# Patient Record
Sex: FEMALE | Race: WHITE | NOT HISPANIC OR LATINO | Employment: UNEMPLOYED | URBAN - METROPOLITAN AREA
[De-identification: names, ages, dates, MRNs, and addresses within clinical notes are randomized per-mention and may not be internally consistent; named-entity substitution may affect disease eponyms.]

---

## 2022-01-14 ENCOUNTER — PRE-ADMISSION TESTING (OUTPATIENT)
Dept: PREADMISSION TESTING | Facility: HOSPITAL | Age: 22
End: 2022-01-14

## 2022-01-14 VITALS — WEIGHT: 254.3 LBS | BODY MASS INDEX: 40.87 KG/M2 | HEIGHT: 66 IN

## 2022-01-14 LAB
ANION GAP SERPL CALCULATED.3IONS-SCNC: 11 MMOL/L (ref 5–15)
BUN SERPL-MCNC: 7 MG/DL (ref 6–20)
BUN/CREAT SERPL: 10.3 (ref 7–25)
CALCIUM SPEC-SCNC: 9 MG/DL (ref 8.6–10.5)
CHLORIDE SERPL-SCNC: 103 MMOL/L (ref 98–107)
CO2 SERPL-SCNC: 26 MMOL/L (ref 22–29)
CREAT SERPL-MCNC: 0.68 MG/DL (ref 0.57–1)
DEPRECATED RDW RBC AUTO: 42.9 FL (ref 37–54)
ERYTHROCYTE [DISTWIDTH] IN BLOOD BY AUTOMATED COUNT: 13.6 % (ref 12.3–15.4)
GFR SERPL CREATININE-BSD FRML MDRD: 110 ML/MIN/1.73
GLUCOSE SERPL-MCNC: 97 MG/DL (ref 65–99)
HCT VFR BLD AUTO: 44.1 % (ref 34–46.6)
HGB BLD-MCNC: 14.1 G/DL (ref 12–15.9)
MCH RBC QN AUTO: 27.8 PG (ref 26.6–33)
MCHC RBC AUTO-ENTMCNC: 32 G/DL (ref 31.5–35.7)
MCV RBC AUTO: 87 FL (ref 79–97)
PLATELET # BLD AUTO: 266 10*3/MM3 (ref 140–450)
PMV BLD AUTO: 11.7 FL (ref 6–12)
POTASSIUM SERPL-SCNC: 4.7 MMOL/L (ref 3.5–5.2)
RBC # BLD AUTO: 5.07 10*6/MM3 (ref 3.77–5.28)
SODIUM SERPL-SCNC: 140 MMOL/L (ref 136–145)
WBC NRBC COR # BLD: 7.97 10*3/MM3 (ref 3.4–10.8)

## 2022-01-14 PROCEDURE — 36415 COLL VENOUS BLD VENIPUNCTURE: CPT

## 2022-01-14 PROCEDURE — 80048 BASIC METABOLIC PNL TOTAL CA: CPT

## 2022-01-14 PROCEDURE — 85027 COMPLETE CBC AUTOMATED: CPT

## 2022-01-14 NOTE — PAT
Patient viewed general PAT education video as instructed in their preoperative information received from their surgeon.  Patient stated the general PAT education video was viewed in its entirety and survey completed.  Copies of [facility] general education handouts (Incentive Spirometry, Meds to Beds Program, Patient Belongings, Pre-op skin preparation instructions, Blood Glucose testing, Visitor policy, Surgery FAQ, Code H) distributed to patient if not printed. Education related to the PAT pass and skin preparation for surgery (if applicable) completed in [facility] as a reinforcement to PAT education video. Patient instructed to return PAT pass provided today as well as completed skin preparation sheet (if applicable) on the day of procedure.     Additionally if patient had not viewed video yet but intended to view it at home or in our waiting area, then referred them to the handout with QR code/link provided during PAT visit.  Instructed patient to complete survey after viewing the video in its entirety.  Encouraged patient/family to read [facility] general education handouts thoroughly and notify PAT staff with any questions or concerns. Patient verbalized understanding of all information and priority content.    Patient to apply Chlorhexadine wipes  to surgical area (as instructed) the night before procedure and the AM of procedure. Wipes provided.    Patient instructed to drink 20 ounces (or until full) of Gatorade and it needs to be completed 1 hour (for Main OR patients) or 2 hours (scheduled  section patients) before given arrival time for procedure (NO RED Gatorade)    Patient verbalized understanding.    Covid test scheduled 2022

## 2022-01-16 ENCOUNTER — APPOINTMENT (OUTPATIENT)
Dept: PREADMISSION TESTING | Facility: HOSPITAL | Age: 22
End: 2022-01-16

## 2022-01-16 LAB — SARS-COV-2 RNA PNL SPEC NAA+PROBE: NOT DETECTED

## 2022-01-16 PROCEDURE — U0004 COV-19 TEST NON-CDC HGH THRU: HCPCS | Performed by: INTERNAL MEDICINE

## 2022-01-16 PROCEDURE — C9803 HOPD COVID-19 SPEC COLLECT: HCPCS

## 2022-01-17 ENCOUNTER — ANESTHESIA EVENT (OUTPATIENT)
Dept: PERIOP | Facility: HOSPITAL | Age: 22
End: 2022-01-17

## 2022-01-17 RX ORDER — FAMOTIDINE 10 MG/ML
20 INJECTION, SOLUTION INTRAVENOUS ONCE
Status: CANCELLED | OUTPATIENT
Start: 2022-01-17 | End: 2022-01-17

## 2022-01-18 ENCOUNTER — ANESTHESIA EVENT CONVERTED (OUTPATIENT)
Dept: ANESTHESIOLOGY | Facility: HOSPITAL | Age: 22
End: 2022-01-18

## 2022-01-18 ENCOUNTER — HOSPITAL ENCOUNTER (OUTPATIENT)
Facility: HOSPITAL | Age: 22
Setting detail: HOSPITAL OUTPATIENT SURGERY
Discharge: HOME | End: 2022-01-18
Attending: PLASTIC SURGERY | Admitting: PLASTIC SURGERY

## 2022-01-18 ENCOUNTER — ANESTHESIA (OUTPATIENT)
Dept: PERIOP | Facility: HOSPITAL | Age: 22
End: 2022-01-18

## 2022-01-18 VITALS
WEIGHT: 254 LBS | TEMPERATURE: 97.6 F | BODY MASS INDEX: 40.82 KG/M2 | RESPIRATION RATE: 20 BRPM | HEART RATE: 62 BPM | DIASTOLIC BLOOD PRESSURE: 91 MMHG | HEIGHT: 66 IN | SYSTOLIC BLOOD PRESSURE: 178 MMHG | OXYGEN SATURATION: 96 %

## 2022-01-18 DIAGNOSIS — N64.81 BREAST PTOSIS: ICD-10-CM

## 2022-01-18 LAB
B-HCG UR QL: NEGATIVE
EXPIRATION DATE: NORMAL
INTERNAL NEGATIVE CONTROL: NORMAL
INTERNAL POSITIVE CONTROL: NORMAL
Lab: NORMAL

## 2022-01-18 PROCEDURE — 25010000002 FENTANYL CITRATE (PF) 50 MCG/ML SOLUTION: Performed by: NURSE ANESTHETIST, CERTIFIED REGISTERED

## 2022-01-18 PROCEDURE — P9041 ALBUMIN (HUMAN),5%, 50ML: HCPCS | Performed by: NURSE ANESTHETIST, CERTIFIED REGISTERED

## 2022-01-18 PROCEDURE — 0 CEFAZOLIN IN DEXTROSE 2-4 GM/100ML-% SOLUTION: Performed by: PLASTIC SURGERY

## 2022-01-18 PROCEDURE — 25010000002 PROPOFOL 10 MG/ML EMULSION: Performed by: NURSE ANESTHETIST, CERTIFIED REGISTERED

## 2022-01-18 PROCEDURE — 25010000002 ALBUMIN HUMAN 5% PER 50 ML: Performed by: NURSE ANESTHETIST, CERTIFIED REGISTERED

## 2022-01-18 PROCEDURE — 88302 TISSUE EXAM BY PATHOLOGIST: CPT | Performed by: PLASTIC SURGERY

## 2022-01-18 PROCEDURE — 25010000002 FENTANYL CITRATE (PF) 50 MCG/ML SOLUTION

## 2022-01-18 PROCEDURE — 25010000002 HYDROMORPHONE PER 4 MG: Performed by: NURSE ANESTHETIST, CERTIFIED REGISTERED

## 2022-01-18 PROCEDURE — 25010000002 HYDROMORPHONE 1 MG/ML SOLUTION

## 2022-01-18 PROCEDURE — 25010000002 NEOSTIGMINE 10 MG/10ML SOLUTION: Performed by: NURSE ANESTHETIST, CERTIFIED REGISTERED

## 2022-01-18 PROCEDURE — 81025 URINE PREGNANCY TEST: CPT | Performed by: ANESTHESIOLOGY

## 2022-01-18 PROCEDURE — 25010000002 MIDAZOLAM PER 1 MG: Performed by: ANESTHESIOLOGY

## 2022-01-18 PROCEDURE — 0 CEFAZOLIN PER 500 MG: Performed by: PLASTIC SURGERY

## 2022-01-18 PROCEDURE — 25010000002 GENTAMICIN PER 80 MG: Performed by: PLASTIC SURGERY

## 2022-01-18 PROCEDURE — 25010000002 EPINEPHRINE PER 0.1 MG: Performed by: PLASTIC SURGERY

## 2022-01-18 PROCEDURE — 25010000002 ONDANSETRON PER 1 MG: Performed by: NURSE ANESTHETIST, CERTIFIED REGISTERED

## 2022-01-18 PROCEDURE — 25010000002 DEXAMETHASONE SODIUM PHOSPHATE 10 MG/ML SOLUTION: Performed by: NURSE ANESTHETIST, CERTIFIED REGISTERED

## 2022-01-18 RX ORDER — NALOXONE HCL 0.4 MG/ML
0.4 VIAL (ML) INJECTION AS NEEDED
Status: DISCONTINUED | OUTPATIENT
Start: 2022-01-18 | End: 2022-01-18 | Stop reason: HOSPADM

## 2022-01-18 RX ORDER — MAGNESIUM HYDROXIDE 1200 MG/15ML
LIQUID ORAL AS NEEDED
Status: DISCONTINUED | OUTPATIENT
Start: 2022-01-18 | End: 2022-01-18 | Stop reason: HOSPADM

## 2022-01-18 RX ORDER — FENTANYL CITRATE 50 UG/ML
INJECTION, SOLUTION INTRAMUSCULAR; INTRAVENOUS AS NEEDED
Status: DISCONTINUED | OUTPATIENT
Start: 2022-01-18 | End: 2022-01-18 | Stop reason: SURG

## 2022-01-18 RX ORDER — SODIUM CHLORIDE 0.9 % (FLUSH) 0.9 %
10 SYRINGE (ML) INJECTION EVERY 12 HOURS SCHEDULED
Status: DISCONTINUED | OUTPATIENT
Start: 2022-01-18 | End: 2022-01-18 | Stop reason: HOSPADM

## 2022-01-18 RX ORDER — PROMETHAZINE HYDROCHLORIDE 25 MG/1
25 TABLET ORAL ONCE AS NEEDED
Status: DISCONTINUED | OUTPATIENT
Start: 2022-01-18 | End: 2022-01-18 | Stop reason: HOSPADM

## 2022-01-18 RX ORDER — GLYCOPYRROLATE 0.2 MG/ML
INJECTION INTRAMUSCULAR; INTRAVENOUS AS NEEDED
Status: DISCONTINUED | OUTPATIENT
Start: 2022-01-18 | End: 2022-01-18 | Stop reason: SURG

## 2022-01-18 RX ORDER — ROCURONIUM BROMIDE 10 MG/ML
INJECTION, SOLUTION INTRAVENOUS AS NEEDED
Status: DISCONTINUED | OUTPATIENT
Start: 2022-01-18 | End: 2022-01-18 | Stop reason: SURG

## 2022-01-18 RX ORDER — HYDROCODONE BITARTRATE AND ACETAMINOPHEN 5; 325 MG/1; MG/1
TABLET ORAL
Status: COMPLETED
Start: 2022-01-18 | End: 2022-01-18

## 2022-01-18 RX ORDER — ONDANSETRON 2 MG/ML
INJECTION INTRAMUSCULAR; INTRAVENOUS AS NEEDED
Status: DISCONTINUED | OUTPATIENT
Start: 2022-01-18 | End: 2022-01-18 | Stop reason: SURG

## 2022-01-18 RX ORDER — HYDROMORPHONE HYDROCHLORIDE 1 MG/ML
0.5 INJECTION, SOLUTION INTRAMUSCULAR; INTRAVENOUS; SUBCUTANEOUS
Status: DISCONTINUED | OUTPATIENT
Start: 2022-01-18 | End: 2022-01-18 | Stop reason: HOSPADM

## 2022-01-18 RX ORDER — SODIUM CHLORIDE, SODIUM LACTATE, POTASSIUM CHLORIDE, CALCIUM CHLORIDE 600; 310; 30; 20 MG/100ML; MG/100ML; MG/100ML; MG/100ML
9 INJECTION, SOLUTION INTRAVENOUS CONTINUOUS
Status: DISCONTINUED | OUTPATIENT
Start: 2022-01-18 | End: 2022-01-18 | Stop reason: HOSPADM

## 2022-01-18 RX ORDER — IPRATROPIUM BROMIDE AND ALBUTEROL SULFATE 2.5; .5 MG/3ML; MG/3ML
3 SOLUTION RESPIRATORY (INHALATION) ONCE AS NEEDED
Status: DISCONTINUED | OUTPATIENT
Start: 2022-01-18 | End: 2022-01-18 | Stop reason: HOSPADM

## 2022-01-18 RX ORDER — DEXAMETHASONE SODIUM PHOSPHATE 10 MG/ML
INJECTION, SOLUTION INTRAMUSCULAR; INTRAVENOUS AS NEEDED
Status: DISCONTINUED | OUTPATIENT
Start: 2022-01-18 | End: 2022-01-18 | Stop reason: SURG

## 2022-01-18 RX ORDER — LIDOCAINE HYDROCHLORIDE 10 MG/ML
INJECTION, SOLUTION EPIDURAL; INFILTRATION; INTRACAUDAL; PERINEURAL AS NEEDED
Status: DISCONTINUED | OUTPATIENT
Start: 2022-01-18 | End: 2022-01-18 | Stop reason: SURG

## 2022-01-18 RX ORDER — HYDROMORPHONE HCL 110MG/55ML
PATIENT CONTROLLED ANALGESIA SYRINGE INTRAVENOUS AS NEEDED
Status: DISCONTINUED | OUTPATIENT
Start: 2022-01-18 | End: 2022-01-18 | Stop reason: SURG

## 2022-01-18 RX ORDER — CEFAZOLIN SODIUM 1 G/3ML
INJECTION, POWDER, FOR SOLUTION INTRAMUSCULAR; INTRAVENOUS
Status: DISCONTINUED
Start: 2022-01-18 | End: 2022-01-18 | Stop reason: WASHOUT

## 2022-01-18 RX ORDER — CEFAZOLIN SODIUM 2 G/100ML
2 INJECTION, SOLUTION INTRAVENOUS ONCE
Status: COMPLETED | OUTPATIENT
Start: 2022-01-18 | End: 2022-01-18

## 2022-01-18 RX ORDER — FAMOTIDINE 20 MG/1
20 TABLET, FILM COATED ORAL ONCE
Status: COMPLETED | OUTPATIENT
Start: 2022-01-18 | End: 2022-01-18

## 2022-01-18 RX ORDER — FENTANYL CITRATE 50 UG/ML
50 INJECTION, SOLUTION INTRAMUSCULAR; INTRAVENOUS
Status: DISCONTINUED | OUTPATIENT
Start: 2022-01-18 | End: 2022-01-18 | Stop reason: HOSPADM

## 2022-01-18 RX ORDER — HYDRALAZINE HYDROCHLORIDE 20 MG/ML
5 INJECTION INTRAMUSCULAR; INTRAVENOUS
Status: DISCONTINUED | OUTPATIENT
Start: 2022-01-18 | End: 2022-01-18 | Stop reason: HOSPADM

## 2022-01-18 RX ORDER — DEXAMETHASONE SODIUM PHOSPHATE 10 MG/ML
INJECTION, SOLUTION INTRAMUSCULAR; INTRAVENOUS
Status: COMPLETED | OUTPATIENT
Start: 2022-01-18 | End: 2022-01-18

## 2022-01-18 RX ORDER — LIDOCAINE HYDROCHLORIDE 10 MG/ML
0.5 INJECTION, SOLUTION EPIDURAL; INFILTRATION; INTRACAUDAL; PERINEURAL ONCE AS NEEDED
Status: COMPLETED | OUTPATIENT
Start: 2022-01-18 | End: 2022-01-18

## 2022-01-18 RX ORDER — SODIUM CHLORIDE 0.9 % (FLUSH) 0.9 %
10 SYRINGE (ML) INJECTION AS NEEDED
Status: DISCONTINUED | OUTPATIENT
Start: 2022-01-18 | End: 2022-01-18 | Stop reason: HOSPADM

## 2022-01-18 RX ORDER — PROPOFOL 10 MG/ML
VIAL (ML) INTRAVENOUS AS NEEDED
Status: DISCONTINUED | OUTPATIENT
Start: 2022-01-18 | End: 2022-01-18 | Stop reason: SURG

## 2022-01-18 RX ORDER — FENTANYL CITRATE 50 UG/ML
INJECTION, SOLUTION INTRAMUSCULAR; INTRAVENOUS
Status: COMPLETED
Start: 2022-01-18 | End: 2022-01-18

## 2022-01-18 RX ORDER — SODIUM CHLORIDE 0.9 % (FLUSH) 0.9 %
3-10 SYRINGE (ML) INJECTION AS NEEDED
Status: DISCONTINUED | OUTPATIENT
Start: 2022-01-18 | End: 2022-01-18 | Stop reason: HOSPADM

## 2022-01-18 RX ORDER — DROPERIDOL 2.5 MG/ML
0.62 INJECTION, SOLUTION INTRAMUSCULAR; INTRAVENOUS AS NEEDED
Status: DISCONTINUED | OUTPATIENT
Start: 2022-01-18 | End: 2022-01-18 | Stop reason: HOSPADM

## 2022-01-18 RX ORDER — MIDAZOLAM HYDROCHLORIDE 1 MG/ML
1 INJECTION INTRAMUSCULAR; INTRAVENOUS
Status: DISCONTINUED | OUTPATIENT
Start: 2022-01-18 | End: 2022-01-18 | Stop reason: HOSPADM

## 2022-01-18 RX ORDER — HYDROCODONE BITARTRATE AND ACETAMINOPHEN 5; 325 MG/1; MG/1
1 TABLET ORAL ONCE AS NEEDED
Status: DISCONTINUED | OUTPATIENT
Start: 2022-01-18 | End: 2022-01-18 | Stop reason: HOSPADM

## 2022-01-18 RX ORDER — SODIUM CHLORIDE 0.9 % (FLUSH) 0.9 %
3 SYRINGE (ML) INJECTION EVERY 12 HOURS SCHEDULED
Status: DISCONTINUED | OUTPATIENT
Start: 2022-01-18 | End: 2022-01-18 | Stop reason: HOSPADM

## 2022-01-18 RX ORDER — ONDANSETRON 2 MG/ML
4 INJECTION INTRAMUSCULAR; INTRAVENOUS ONCE AS NEEDED
Status: DISCONTINUED | OUTPATIENT
Start: 2022-01-18 | End: 2022-01-18 | Stop reason: HOSPADM

## 2022-01-18 RX ORDER — BUPIVACAINE HYDROCHLORIDE 2.5 MG/ML
INJECTION, SOLUTION EPIDURAL; INFILTRATION; INTRACAUDAL
Status: COMPLETED | OUTPATIENT
Start: 2022-01-18 | End: 2022-01-18

## 2022-01-18 RX ORDER — DROPERIDOL 2.5 MG/ML
0.62 INJECTION, SOLUTION INTRAMUSCULAR; INTRAVENOUS ONCE AS NEEDED
Status: DISCONTINUED | OUTPATIENT
Start: 2022-01-18 | End: 2022-01-18 | Stop reason: HOSPADM

## 2022-01-18 RX ORDER — PROMETHAZINE HYDROCHLORIDE 25 MG/1
25 SUPPOSITORY RECTAL ONCE AS NEEDED
Status: DISCONTINUED | OUTPATIENT
Start: 2022-01-18 | End: 2022-01-18 | Stop reason: HOSPADM

## 2022-01-18 RX ORDER — LABETALOL HYDROCHLORIDE 5 MG/ML
5 INJECTION, SOLUTION INTRAVENOUS
Status: DISCONTINUED | OUTPATIENT
Start: 2022-01-18 | End: 2022-01-18 | Stop reason: HOSPADM

## 2022-01-18 RX ORDER — NEOSTIGMINE METHYLSULFATE 1 MG/ML
INJECTION, SOLUTION INTRAVENOUS AS NEEDED
Status: DISCONTINUED | OUTPATIENT
Start: 2022-01-18 | End: 2022-01-18 | Stop reason: SURG

## 2022-01-18 RX ORDER — ALBUMIN, HUMAN INJ 5% 5 %
SOLUTION INTRAVENOUS CONTINUOUS PRN
Status: DISCONTINUED | OUTPATIENT
Start: 2022-01-18 | End: 2022-01-18 | Stop reason: SURG

## 2022-01-18 RX ADMIN — NEOSTIGMINE METHYLSULFATE 3 MG: 0.5 INJECTION INTRAVENOUS at 15:33

## 2022-01-18 RX ADMIN — PROPOFOL 50 MCG/KG/MIN: 10 INJECTION, EMULSION INTRAVENOUS at 09:15

## 2022-01-18 RX ADMIN — PROPOFOL 70 MG: 10 INJECTION, EMULSION INTRAVENOUS at 08:50

## 2022-01-18 RX ADMIN — HYDROMORPHONE HYDROCHLORIDE 0.5 MG: 1 INJECTION, SOLUTION INTRAMUSCULAR; INTRAVENOUS; SUBCUTANEOUS at 17:47

## 2022-01-18 RX ADMIN — SODIUM CHLORIDE, POTASSIUM CHLORIDE, SODIUM LACTATE AND CALCIUM CHLORIDE: 600; 310; 30; 20 INJECTION, SOLUTION INTRAVENOUS at 11:20

## 2022-01-18 RX ADMIN — ROCURONIUM BROMIDE 50 MG: 10 INJECTION, SOLUTION INTRAVENOUS at 07:49

## 2022-01-18 RX ADMIN — DEXAMETHASONE SODIUM PHOSPHATE 6 MG: 10 INJECTION, SOLUTION INTRAMUSCULAR; INTRAVENOUS at 08:30

## 2022-01-18 RX ADMIN — CEFAZOLIN SODIUM 2 G: 2 INJECTION, SOLUTION INTRAVENOUS at 11:51

## 2022-01-18 RX ADMIN — ONDANSETRON 4 MG: 2 INJECTION INTRAMUSCULAR; INTRAVENOUS at 08:30

## 2022-01-18 RX ADMIN — FENTANYL CITRATE 50 MCG: 50 INJECTION, SOLUTION INTRAMUSCULAR; INTRAVENOUS at 16:36

## 2022-01-18 RX ADMIN — ROCURONIUM BROMIDE 20 MG: 10 INJECTION, SOLUTION INTRAVENOUS at 11:22

## 2022-01-18 RX ADMIN — SODIUM CHLORIDE, POTASSIUM CHLORIDE, SODIUM LACTATE AND CALCIUM CHLORIDE: 600; 310; 30; 20 INJECTION, SOLUTION INTRAVENOUS at 13:11

## 2022-01-18 RX ADMIN — ROCURONIUM BROMIDE 20 MG: 10 INJECTION, SOLUTION INTRAVENOUS at 08:50

## 2022-01-18 RX ADMIN — HYDROCODONE BITARTRATE AND ACETAMINOPHEN 1 TABLET: 5; 325 TABLET ORAL at 18:03

## 2022-01-18 RX ADMIN — ROCURONIUM BROMIDE 30 MG: 10 INJECTION, SOLUTION INTRAVENOUS at 12:56

## 2022-01-18 RX ADMIN — FENTANYL CITRATE 50 MCG: 50 INJECTION, SOLUTION INTRAMUSCULAR; INTRAVENOUS at 09:38

## 2022-01-18 RX ADMIN — HYDROMORPHONE HYDROCHLORIDE 0.5 MG: 2 INJECTION, SOLUTION INTRAMUSCULAR; INTRAVENOUS; SUBCUTANEOUS at 12:31

## 2022-01-18 RX ADMIN — GLYCOPYRROLATE 0.4 MG: 0.2 INJECTION INTRAMUSCULAR; INTRAVENOUS at 15:33

## 2022-01-18 RX ADMIN — MIDAZOLAM HYDROCHLORIDE 2 MG: 1 INJECTION, SOLUTION INTRAMUSCULAR; INTRAVENOUS at 07:49

## 2022-01-18 RX ADMIN — ROCURONIUM BROMIDE 30 MG: 10 INJECTION, SOLUTION INTRAVENOUS at 10:05

## 2022-01-18 RX ADMIN — CEFAZOLIN SODIUM 2 G: 2 INJECTION, SOLUTION INTRAVENOUS at 16:37

## 2022-01-18 RX ADMIN — HYDROMORPHONE HYDROCHLORIDE 0.5 MG: 2 INJECTION, SOLUTION INTRAMUSCULAR; INTRAVENOUS; SUBCUTANEOUS at 12:15

## 2022-01-18 RX ADMIN — DEXAMETHASONE SODIUM PHOSPHATE 4 MG: 10 INJECTION, SOLUTION INTRAMUSCULAR; INTRAVENOUS at 07:28

## 2022-01-18 RX ADMIN — FAMOTIDINE 20 MG: 20 TABLET, FILM COATED ORAL at 06:35

## 2022-01-18 RX ADMIN — FENTANYL CITRATE 50 MCG: 50 INJECTION, SOLUTION INTRAMUSCULAR; INTRAVENOUS at 11:06

## 2022-01-18 RX ADMIN — BUPIVACAINE HYDROCHLORIDE 60 ML: 2.5 INJECTION, SOLUTION EPIDURAL; INFILTRATION; INTRACAUDAL; PERINEURAL at 07:28

## 2022-01-18 RX ADMIN — SODIUM CHLORIDE, POTASSIUM CHLORIDE, SODIUM LACTATE AND CALCIUM CHLORIDE: 600; 310; 30; 20 INJECTION, SOLUTION INTRAVENOUS at 08:51

## 2022-01-18 RX ADMIN — SODIUM CHLORIDE, POTASSIUM CHLORIDE, SODIUM LACTATE AND CALCIUM CHLORIDE 9 ML/HR: 600; 310; 30; 20 INJECTION, SOLUTION INTRAVENOUS at 06:40

## 2022-01-18 RX ADMIN — PROPOFOL 230 MG: 10 INJECTION, EMULSION INTRAVENOUS at 07:49

## 2022-01-18 RX ADMIN — FENTANYL CITRATE 100 MCG: 50 INJECTION, SOLUTION INTRAMUSCULAR; INTRAVENOUS at 07:49

## 2022-01-18 RX ADMIN — LIDOCAINE HYDROCHLORIDE 50 MG: 10 INJECTION, SOLUTION EPIDURAL; INFILTRATION; INTRACAUDAL; PERINEURAL at 07:49

## 2022-01-18 RX ADMIN — CEFAZOLIN SODIUM 2 G: 2 INJECTION, SOLUTION INTRAVENOUS at 07:55

## 2022-01-18 RX ADMIN — LIDOCAINE HYDROCHLORIDE 0.5 ML: 10 INJECTION, SOLUTION EPIDURAL; INFILTRATION; INTRACAUDAL; PERINEURAL at 06:40

## 2022-01-18 RX ADMIN — FENTANYL CITRATE 50 MCG: 50 INJECTION, SOLUTION INTRAMUSCULAR; INTRAVENOUS at 12:05

## 2022-01-18 RX ADMIN — FENTANYL CITRATE 50 MCG: 50 INJECTION, SOLUTION INTRAMUSCULAR; INTRAVENOUS at 16:53

## 2022-01-18 RX ADMIN — ALBUMIN HUMAN: 0.05 INJECTION, SOLUTION INTRAVENOUS at 13:34

## 2022-01-18 RX ADMIN — FENTANYL CITRATE 100 MCG: 50 INJECTION, SOLUTION INTRAMUSCULAR; INTRAVENOUS at 08:28

## 2022-01-18 RX ADMIN — ALBUMIN HUMAN: 0.05 INJECTION, SOLUTION INTRAVENOUS at 14:51

## 2022-01-18 NOTE — OP NOTE
DATE OF PROCEDURE: 01/18/22    PREOPERATIVE DIAGNOSIS:   1. Encounter for cosmetic surgery  2. Bilateral breast ptosis  3. Bilateral breast hypoplasia  4. Excess abdominal, arm and back adiposity  5. Excess abdominal skin     POSTOPERATIVE DIAGNOSIS:   1. Encounter for cosmetic surgery  2. Bilateral breast ptosis  3. Bilateral breast hypoplasia  4. Excess abdominal, arm and back adiposity  5. Excess abdominal skin     PROCEDURES PERFORMED:  1. Liposuction of back, flanks, arms and axillas.   2. Bilateral breast augmentation mastopexy  3. Abdominoplasty     SURGEON: Bismark Meyer MD     ASSISTANT: Cuauhtemoc Akhtar MD; JULIETH Duarte     Circulator: Ignacio Dial RN; Tiana Le RN; Sheela Salgado, CRISPIN; Dale Lopez RN  Scrub Person: Yael Luque; Mary Redman  Nursing Assistant: Chidi Monsivais     ANESTHESIA: General.    INDICATIONS: The patient is a 21-year-old female who presented to my office with complaint of breast ptosis and excess abdominal skin and fat as well as excess arm adiposity. Additionally she had an emergency laparotomy as a 6 year old for trauma and had a large midline scar that caused her abdomen to be misshapen. She desired body contouring and the aforementioned procedures. The patient understood all of the risks and benefits of the procedure, including infection, need for future surgeries and elected to proceed.      FINDINGS:  1. Removal of 1000cc of lipoaspirate from each back side  2. Removal of 500cc of lipoaspirate from each flank  3. Removal of 800cc of lipoaspirate from each arm  4. Removal of 40 grams of breast tissue from right breast and 55 grams from left breast  5. Removal of 4.5 pounds from abdomen  6. Placement of an Sientra-style 106  cc silicone implant on the right with a serial number of [id number].  7. Placement of an Sientra-style 106  cc silicone implant on the left with a serial number of [id number].         DESCRIPTION OF PROCEDURE: The patient was  seen in preoperative holding and then marked in a standing position and taken to the operating room by anesthesia after informed consent was signed and on the chart. The patient was placed supine on the operating room table and general anesthesia was induced. Once verified, the case was then turned over to the surgical service. TAP blocks were performed by the anesthesia service. The patient was placed prone on the table for the back liposuction portion of the case. We infilitrated tumescent solution to her upper back first. Then performed liposuction of her upper back and lower back.  We removed the aforementioned amounts. The incisions were then closed with a 4-0 nylon. The patient was then flipped supine on the OR table.     We next performed the arm liposuction of the case. We infiltrated tumescent solution to each arm and then performed liposuction of each arm with a 5.2 mm canula. I then went back and broke up any contour irregularities. Those incisions were then closed with a 4-0 nylon.     We then reprepped and draped and began on the right side to perform the breast augmentation part of the case. A 4cm IM fold incision was made with a 15 blade scalpel on the left. We dissected down to pectoralis muscle. It was released in a Dual plane 1 technique. We then made a subpectoral pocket. Sizers were used and the Sientra  cc implant was selected. It was opened and soaked in antibiotic irrigation. I changed my gloves and a Rosales funnel was opened. The implant was placed in the pocket with the Rosales funnel in a no touch technique. We locked the fold down to the chest wall with a 2-0 Vicryl suture and then closed the incision in layers with monocryl.  I then moved to the left side and performed an identical procedure. A Dual plane 1 breast augmentation with a Sientra  cc implant. At this point, the incisions were then closed with 2-0 Vicryl in an interrupted fashion and then a 3-0 Monocryl and a 4-0  Monocryl to close the skin. Skin Affix glue was placed on the incisions.      We  next began with the mastopexy portion of the case. A 42mm cookie cutter was used on the right first. We then tailor tacked our marks with staples to make sure they were appropriately in place. After confirming our marks we continued on with the mastopexy. After scoring the areola we made a superior pedicle. This was deepithelialized. We then developed our skin flaps and resected lower pole breast tissue. At this point we rotated the nipple up and inset it with a 2-0 vicryl suture. We then performed central pillar sutures. We last stapled the incisions closed temporarily. I moved to the left side and performed an identical procedure. We sat the patient up and was noted to have good symmetry. She was sat back down and we began closing the incisions with a 3-0 Monocryl and a 4-0 Monocryl sutures.      We next moved to the abdominoplasty portion of the case. I first performed flank liposuction bilaterally. Then after making the lower abdominal incision with a 10 blade scalpel we disected up to the xiphoid process after freeing up her umbilicus. I then performed the plication portion of the case with tightening of her rectus diastasis. I used a large looped 0 nylon in a running fashion both above and below the umbilicus.  I then flexed the table at her waist and performed progressive tension sutures with a 2-0 vicryl in multiple different rows. We then excised a large section of her abdominal skin. Bovie cautery was used for bleeding. Additionally we excised her midline scar from her previous surgery in a Sharyn Muniz style abdominoplasty. We closed the incision after placing 2 15 Tamazight Emeka drain. The incision was closed first with 2-0 vicryl in Ying's fascia. Then 3-0 monocryl, Insorb stapler and  A 3-0 Stratafix suture in a running fashion. The umbilicus was brought out through a new position on her abdominal skin and sutured in to  place with 4-0 Monocryl and a 5-0 Monocyrl. Skin afix skin glue was placed on all incisions.  She then had Kerlix fluffs and an ACE wrap placed. She also had an abdominal binder placed as well.  She was awakened, extubated and taken to PACU in stable condition. All sponge and instrument counts were correct. I, Dr. Bismark Meyer, was present and scrubbed for the entire procedure.      SPECIMENS: Breast tissue.         ESTIMATED BLOOD LOSS: 150cc.     DRAINS: JPx2.      COMPLICATIONS: None immediate.     Bismark Meyer MD  01/18/22  16:14 EST

## 2022-01-18 NOTE — ANESTHESIA PROCEDURE NOTES
Peripheral Block      Patient reassessed immediately prior to procedure    Patient location during procedure: OR  Start time: 1/18/2022 7:53 AM  Reason for block: at surgeon's request and post-op pain management  Preanesthetic Checklist  Completed: patient identified, IV checked, site marked, risks and benefits discussed, surgical consent, monitors and equipment checked, pre-op evaluation and timeout performed  Prep:  Pt Position: supine  Sterile barriers:cap, gloves, sterile barriers and mask  Prep: ChloraPrep  Patient monitoring: blood pressure monitoring, continuous pulse oximetry and EKG  Procedure    Sedation: yes  Performed under: general  Guidance:ultrasound guided  Images:still images obtained, printed/placed on chart    Laterality:Bilateral  Block Type:TAP  Injection Technique:single-shot  Needle Type:short-bevel and echogenic  Needle Gauge:20 G  Resistance on Injection: none    Medications Used: bupivacaine PF (MARCAINE) 0.25 % injection, 60 mL  dexamethasone sodium phosphate injection, 4 mg      Medications  Comment:Block Injection:  LA dose divided between Right and Left block        Post Assessment  Injection Assessment: negative aspiration for heme, incremental injection and no paresthesia on injection  Patient Tolerance:comfortable throughout block  Complications:no  Additional Notes      Under Ultrasound guidance, a BBraun 4inch 360 degree needle was advanced with Normal Saline hydro dissection of tissue.  The Internal Oblique and Transversus Abdominus muscles where visualized.  At or before the aponeurosis of Internal Oblique, local anesthetic spread was visualized in the Transversus Abdominus Plane. Injection was made incrementally with aspiration every 5 mls.  There was no  intravascular injection,  injection pressure was normal, there was no neural injection, and the procedure was completed without difficulty.  Thank You.

## 2022-01-18 NOTE — BRIEF OP NOTE
ABDOMINOPLASTY, MASTOPEXY, BREAST AUGMENTATION  Progress Note    Kaitlin Ko  1/18/2022    Pre-op Diagnosis:   1. Encounter for cosmetic surgery  2. Breast ptosis  3. Excess abdominal skin  4. Excess adiposity in back and arms       Post-Op Diagnosis Codes:   same     Procedure/CPT® Codes:        Procedure(s):  ABDOMINOPLASTY WITH LIPOSUCTION  MASTOPEXY  BREAST AUGMENTATION AND IMPLANTS AND  LIPOSUCTION    Surgeon(s):  Bismark Meyer MD Moore, Evan M, MD    Anesthesia: General    Staff:   Circulator: Ignacio Dial, CRISPIN; Tiana Le, CRISPIN; Sheela Salgado, CRISPIN; Dale Lopez, CRISPIN  Scrub Person: Yael Luque; Mary Redman  Nursing Assistant: Chidi Monsivais         Estimated Blood Loss: 150 mL    Urine Voided: 325 mL    Specimens:                Specimens     ID Source Type Tests Collected By Collected At Frozen?    A Breast, Right Tissue · TISSUE PATHOLOGY EXAM   Bismark Meyer MD 1/18/22 1143     This specimen was not marked as sent.    B Breast, Left Tissue · TISSUE PATHOLOGY EXAM   Bismark Meyer MD 1/18/22 1149     This specimen was not marked as sent.                Drains:   Urethral Catheter Double-lumen; Non-latex; Straight-tip; Silicone 16 Fr. (Active)     ELINA x 2     Findings: see op report     Complications: none immediate           Bismark Meyer MD     Date: 1/18/2022  Time: 16:10 EST

## 2022-01-18 NOTE — H&P
"Pre-Op H&P  Kaitlin Ko  [id number]  2000    Chief complaint: cosmetic surgery    HPI:    Patient is a 21 y.o.female who presents who presents for cosmetic surgery. She is known to Dr. Meyer. She is scheduled for abdominoplasty with liposuction, bilateral mastopexy with augmentation and implants, liposuction  She does not take any blood thinner. She has no known heart or lung issues.     Review of Systems:  General ROS: negative for chills, fever or skin lesions.  Cardiovascular ROS: no chest pain or dyspnea on exertion  Respiratory ROS: no cough, shortness of breath, or wheezing    Allergies: No Known Allergies    Home Meds:    No current facility-administered medications on file prior to encounter.     No current outpatient medications on file prior to encounter.       PMH:   Past Medical History:   Diagnosis Date   • Anxiety      PSH:    Past Surgical History:   Procedure Laterality Date   • BACK SURGERY      lower back   • EXPLORATORY LAPAROTOMY      at age 6       Immunization History:  Influenza: due  Pneumococcal: UTD  Tetanus: unsure    Social History:   Tobacco:   Social History     Tobacco Use   Smoking Status Former Smoker   • Years: 3.00   • Types: Cigarettes   • Quit date: 12/10/2021   • Years since quittin.1   Smokeless Tobacco Never Used   Tobacco Comment    6  cigerettes a day      Alcohol:     Social History     Substance and Sexual Activity   Alcohol Use None    Comment: social        Vitals:           /83 (BP Location: Right arm, Patient Position: Lying)   Pulse 87   Temp 98 °F (36.7 °C) (Temporal)   Resp 18   Ht 167.6 cm (66\")   Wt 115 kg (254 lb)   LMP 2022 (Exact Date)   SpO2 96%   BMI 41.00 kg/m²     Physical Exam:  General Appearance:    Alert, cooperative, no distress, appears stated age   Head:    Normocephalic, without obvious abnormality, atraumatic   Lungs:     Clear to auscultation bilaterally, respirations unlabored    Heart:   Regular rate and " rhythm, no murmur, rub or gallop    Abdomen:    Soft, nontender. No rigidity or guarding.            Extremities:   Extremities normal, atraumatic, no cyanosis or edema   Skin:   Skin color, texture, turgor normal, no rashes or lesions   Neurologic:   Grossly intact   Results Review  LABS:  Lab Results   Component Value Date    WBC 7.97 01/14/2022    HGB 14.1 01/14/2022    HCT 44.1 01/14/2022    MCV 87.0 01/14/2022     01/14/2022    GLUCOSE 97 01/14/2022    BUN 7 01/14/2022    CREATININE 0.68 01/14/2022    EGFRIFNONA 110 01/14/2022     01/14/2022    K 4.7 01/14/2022     01/14/2022    CO2 26.0 01/14/2022    CALCIUM 9.0 01/14/2022       RADIOLOGY:  No radiology results for the last 3 days     I reviewed the patient's new imaging results and agree with the interpretation.    Impression: encounter for cosmetic surgery    Plan: abdominoplasty with liposuction, bilateral mastopexy with augmentation and implants, liposuction     Chidi Marion PA-C   1/18/2022   06:54 EST

## 2022-01-18 NOTE — ANESTHESIA PREPROCEDURE EVALUATION
Anesthesia Evaluation     Patient summary reviewed and Nursing notes reviewed   no history of anesthetic complications:  NPO Solid Status: > 8 hours  NPO Liquid Status: > 2 hours           Airway   Mallampati: II  TM distance: >3 FB  Neck ROM: full  No difficulty expected  Dental - normal exam     Pulmonary - normal exam    breath sounds clear to auscultation  (+) a smoker (quit x 1 month) Former,   Cardiovascular - negative cardio ROS and normal exam    Rhythm: regular  Rate: normal        Neuro/Psych  GI/Hepatic/Renal/Endo    (+) morbid obesity,      Musculoskeletal (-) negative ROS    Abdominal    Substance History      OB/GYN          Other - negative ROS                       Anesthesia Plan    ASA 3     general with block   (Bilateral TAP blocks post-induction for post-operative analgesia per request of Dr. Meyer)  intravenous induction     Anesthetic plan, all risks, benefits, and alternatives have been provided, discussed and informed consent has been obtained with: patient.    Plan discussed with CRNA.

## 2022-01-18 NOTE — ANESTHESIA PROCEDURE NOTES
Airway  Urgency: elective    Date/Time: 1/18/2022 7:51 AM  Airway not difficult    General Information and Staff    Patient location during procedure: OR  CRNA: Natividad Nuno CRNA    Indications and Patient Condition  Indications for airway management: airway protection    Preoxygenated: yes  MILS not maintained throughout  Mask difficulty assessment: 1 - vent by mask    Final Airway Details  Final airway type: endotracheal airway      Successful airway: ETT  Cuffed: yes   Successful intubation technique: direct laryngoscopy  Endotracheal tube insertion site: oral  Blade: Tobin  Blade size: 4  ETT size (mm): 7.0  Cormack-Lehane Classification: grade I - full view of glottis  Placement verified by: chest auscultation and capnometry   Measured from: lips  ETT/EBT  to lips (cm): 20  Number of attempts at approach: 1  Assessment: lips, teeth, and gum same as pre-op and atraumatic intubation    Additional Comments  Negative epigastric sounds, Breath sound equal bilaterally with symmetric chest rise and fall

## 2022-01-20 LAB
CYTO UR: NORMAL
LAB AP CASE REPORT: NORMAL
LAB AP CLINICAL INFORMATION: NORMAL
PATH REPORT.FINAL DX SPEC: NORMAL
PATH REPORT.GROSS SPEC: NORMAL

## 2023-10-27 NOTE — ANESTHESIA POSTPROCEDURE EVALUATION
Patient: Kaitlin Ko    Procedure Summary     Date: 01/18/22 Room / Location:  [facility] /  [facility]    Anesthesia Start: 0745 Anesthesia Stop:     Procedures:       ABDOMINOPLASTY WITH LIPOSUCTION (N/A Abdomen)      MASTOPEXY (Bilateral Breast)      BREAST AUGMENTATION AND IMPLANTS AND  LIPOSUCTION (Bilateral Breast) Diagnosis:     Surgeons: Bismark Meyer MD Provider: Ravin Cotter MD    Anesthesia Type: general with block ASA Status: 3          Anesthesia Type: general with block    Vitals  Vitals Value Taken Time   /83 01/18/22 1608   Temp 97 °F (36.1 °C) 01/18/22 1608   Pulse 67 01/18/22 1608   Resp 12 01/18/22 1608   SpO2 100 % 01/18/22 1608           Post Anesthesia Care and Evaluation    Patient location during evaluation: PACU  Patient participation: complete - patient cannot participate  Level of consciousness: responsive to physical stimuli  Pain score: 0  Pain management: adequate  Airway patency: patent  Anesthetic complications: No anesthetic complications    Cardiovascular status: stable  Respiratory status: acceptable, nasal cannula, oral airway and spontaneous ventilation  Hydration status: stable    Comments: Pt transferred to PACU with O2. Vital signs stable. Report to PACU RN and care accepted.       Surgeon Performing Repair (Optional): Maycol Chaudhry MD

## 2024-04-11 ENCOUNTER — INITIAL PRENATAL (OUTPATIENT)
Dept: OBSTETRICS AND GYNECOLOGY | Facility: CLINIC | Age: 24
End: 2024-04-11
Payer: COMMERCIAL

## 2024-04-11 VITALS — WEIGHT: 204 LBS | SYSTOLIC BLOOD PRESSURE: 118 MMHG | BODY MASS INDEX: 32.93 KG/M2 | DIASTOLIC BLOOD PRESSURE: 70 MMHG

## 2024-04-11 DIAGNOSIS — O21.9 NAUSEA/VOMITING IN PREGNANCY: ICD-10-CM

## 2024-04-11 DIAGNOSIS — O36.80X0 ENCOUNTER TO DETERMINE FETAL VIABILITY OF PREGNANCY, SINGLE OR UNSPECIFIED FETUS: ICD-10-CM

## 2024-04-11 DIAGNOSIS — Z34.81 ENCOUNTER FOR SUPERVISION OF OTHER NORMAL PREGNANCY IN FIRST TRIMESTER: Primary | ICD-10-CM

## 2024-04-11 DIAGNOSIS — Z86.32 HISTORY OF GESTATIONAL DIABETES: ICD-10-CM

## 2024-04-11 RX ORDER — PNV NO.95/FERROUS FUM/FOLIC AC 28MG-0.8MG
1 TABLET ORAL DAILY
Qty: 90 TABLET | Refills: 3 | Status: SHIPPED | OUTPATIENT
Start: 2024-04-11

## 2024-04-11 RX ORDER — DIPHENHYDRAMINE HYDROCHLORIDE 25 MG/1
25 CAPSULE ORAL NIGHTLY
Qty: 30 TABLET | Refills: 5 | Status: SHIPPED | OUTPATIENT
Start: 2024-04-11

## 2024-04-11 RX ORDER — ONDANSETRON 4 MG/1
4 TABLET, ORALLY DISINTEGRATING ORAL EVERY 8 HOURS PRN
Qty: 30 TABLET | Refills: 2 | Status: SHIPPED | OUTPATIENT
Start: 2024-04-11

## 2024-04-11 NOTE — PROGRESS NOTES
New Pregnancy Visit    Subjective   Chief Complaint   Patient presents with    Initial Prenatal Visit     Scan today. Patient complains of nausea, lightheaded, dizziness     Kaitlin Ko is a 23 y.o.  at 13w1d. Estimated Date of Delivery: 10/16/24 by LMP c/w sono today. She presents to initiate prenatal care with our group today.     Reports N/V and lightheadedness. Denies VB.    OB/GYN Hx:   OB History    Para Term  AB Living   2 1 1     1   SAB IAB Ectopic Molar Multiple Live Births             1      # Outcome Date GA Lbr David/2nd Weight Sex Type Anes PTL Lv   2 Current            1 Term 19 39w6d  3317 g (7 lb 5 oz) M Vag-Spont   YOMI      Obstetric Comments   G[facility] at [facility]      Other pertinent history:   Past Medical History:   Diagnosis Date    Anxiety       Past Surgical History:   Procedure Laterality Date    ABDOMINOPLASTY N/A 2022    Procedure: ABDOMINOPLASTY WITH LIPOSUCTION;  Surgeon: Bismark Meyer MD;  Location:  [facility];  Service: Plastics;  Laterality: N/A;    APPENDECTOMY      SBO, emergent laparotomy    BACK SURGERY      lower back    BREAST AUGMENTATION Bilateral 2022    Procedure: BREAST AUGMENTATION AND IMPLANTS AND  LIPOSUCTION;  Surgeon: Bismark Meyer MD;  Location:  [facility];  Service: Plastics;  Laterality: Bilateral;    EXPLORATORY LAPAROTOMY      at age 6    MASTOPEXY Bilateral 2022    Procedure: MASTOPEXY;  Surgeon: Bismark Meyer MD;  Location:  [facility];  Service: Plastics;  Laterality: Bilateral;      Social History    Tobacco Use      Smoking status: Former        Packs/day: 0.00        Types: Cigarettes        Start date: 12/10/2018        Quit date: 12/10/2021        Years since quittin.3      Smokeless tobacco: Never    No current outpatient medications on file prior to visit.     No current facility-administered medications on file prior to visit.        Objective   /70   Wt 92.5  kg (204 lb)   LMP 01/10/2024   BMI 32.93 kg/m²   Physical Exam:  See prenatal physical     Imaging Review:   US today -   IUP measuring 12w4d with appropriate fetal cardiac activity. WYATT is set at 10/16/24, consistent with 13w1d today, but last menstrual period. The cervix and bilateral ovaries are normal in appearance. No free fluid in the cul-de-sac.     Assessment & Plan     IUP at 13w1 weeks  Routine OB -   Prenatal labs ordered today  Pap smear completed today  Genetic testing reviewed:  cfDNA ordered  Information reviewed: exercise in pregnancy, nutrition in pregnancy, weight gain in pregnancy, work and travel restrictions during pregnancy, list of OTC medications acceptable in pregnancy, and call coverage groups  H/o GDM: A1GDM in G1 pregnancy. Records requested. Consider early GTT.  N/V: start unisom, B6, PRN zofran     Diagnosis Plan   1. Encounter for supervision of other normal pregnancy in first trimester  Prenatal Vit-Fe Fumarate-FA (prenatal vitamin 28-0.8) 28-0.8 MG tablet tablet    OB Panel With HIV    [id number] PLUS Core+SCA - Blood,    LIQUID-BASED PAP SMEAR WITH HPV GENOTYPING IF ASCUS (SASHA,COR,MAD)    Urine Drug Screen - Urine, Clean Catch      2. Encounter to determine fetal viability of pregnancy, single or unspecified fetus  US Ob < 14 Weeks Single or First Gestation      3. History of gestational diabetes        4. Nausea/vomiting in pregnancy  doxylamine (UNISOM) 25 MG tablet    vitamin B-6 (PYRIDOXINE) 25 MG tablet    ondansetron ODT (ZOFRAN-ODT) 4 MG disintegrating tablet         Follow up: 5 week(s)    Mehnaz Ho MD  Obstetrics and Gynecology  [facility]

## 2024-04-15 LAB
ABO GROUP BLD: ABNORMAL
AMPHETAMINES UR QL SCN: NEGATIVE NG/ML
BARBITURATES UR QL SCN: NEGATIVE NG/ML
BASOPHILS # BLD AUTO: 0 X10E3/UL (ref 0–0.2)
BASOPHILS NFR BLD AUTO: 0 %
BENZODIAZ UR QL SCN: NEGATIVE NG/ML
BLD GP AB SCN SERPL QL: NEGATIVE
BZE UR QL SCN: NEGATIVE NG/ML
CANNABINOIDS UR QL SCN: POSITIVE NG/ML
CFDNA.FET/CFDNA.TOTAL SFR FETUS: NORMAL %
CITATION REF LAB TEST: NORMAL
CREAT UR-MCNC: 80.8 MG/DL (ref 20–300)
EOSINOPHIL # BLD AUTO: 0 X10E3/UL (ref 0–0.4)
EOSINOPHIL NFR BLD AUTO: 0 %
ERYTHROCYTE [DISTWIDTH] IN BLOOD BY AUTOMATED COUNT: 12.7 % (ref 11.7–15.4)
FET 13+18+21+X+Y ANEUP PLAS.CFDNA: NEGATIVE
FET CHR 21 TS PLAS.CFDNA QL: NEGATIVE
FET MS X RISK WBC.DNA+CFDNA QL: NOT DETECTED
FET SEX PLAS.CFDNA DOSAGE CFDNA: NORMAL
FET TS 13 RISK PLAS.CFDNA QL: NEGATIVE
FET TS 18 RISK WBC.DNA+CFDNA QL: NEGATIVE
FET X + Y ANEUP RISK PLAS.CFDNA SEQ-IMP: NOT DETECTED
GA EST FROM CONCEPTION DATE: NORMAL D
GESTATIONAL AGE > 9:: YES
HBV SURFACE AG SERPL QL IA: NEGATIVE
HCT VFR BLD AUTO: 40.1 % (ref 34–46.6)
HCV IGG SERPL QL IA: NON REACTIVE
HGB BLD-MCNC: 13.3 G/DL (ref 11.1–15.9)
HIV 1+2 AB+HIV1 P24 AG SERPL QL IA: NON REACTIVE
IMM GRANULOCYTES # BLD AUTO: 0.1 X10E3/UL (ref 0–0.1)
IMM GRANULOCYTES NFR BLD AUTO: 1 %
LAB DIRECTOR NAME PROVIDER: NORMAL
LAB DIRECTOR NAME PROVIDER: NORMAL
LABORATORY COMMENT REPORT: ABNORMAL
LABORATORY COMMENT REPORT: NORMAL
LIMITATIONS OF THE TEST: NORMAL
LYMPHOCYTES # BLD AUTO: 1.5 X10E3/UL (ref 0.7–3.1)
LYMPHOCYTES NFR BLD AUTO: 13 %
MCH RBC QN AUTO: 29.1 PG (ref 26.6–33)
MCHC RBC AUTO-ENTMCNC: 33.2 G/DL (ref 31.5–35.7)
MCV RBC AUTO: 88 FL (ref 79–97)
METHADONE UR QL SCN: NEGATIVE NG/ML
MONOCYTES # BLD AUTO: 0.6 X10E3/UL (ref 0.1–0.9)
MONOCYTES NFR BLD AUTO: 5 %
NEGATIVE PREDICTIVE VALUE: NORMAL
NEUTROPHILS # BLD AUTO: 9.4 X10E3/UL (ref 1.4–7)
NEUTROPHILS NFR BLD AUTO: 81 %
NOTE: NORMAL
OPIATES UR QL SCN: NEGATIVE NG/ML
OXYCODONE+OXYMORPHONE UR QL SCN: NEGATIVE NG/ML
PCP UR QL: NEGATIVE NG/ML
PERFORMANCE CHARACTERISTICS: NORMAL
PH UR: 5.8 [PH] (ref 4.5–8.9)
PLATELET # BLD AUTO: 203 X10E3/UL (ref 150–450)
POSITIVE PREDICTIVE VALUE: NORMAL
PROPOXYPH UR QL SCN: NEGATIVE NG/ML
RBC # BLD AUTO: 4.57 X10E6/UL (ref 3.77–5.28)
REF LAB TEST METHOD: NORMAL
REF LAB TEST METHOD: NORMAL
RH BLD: POSITIVE
RPR SER QL: NON REACTIVE
RUBV IGG SERPL IA-ACNC: 1.02 INDEX
TEST PERFORMANCE INFO SPEC: NORMAL
WBC # BLD AUTO: 11.6 X10E3/UL (ref 3.4–10.8)

## 2024-05-16 ENCOUNTER — ROUTINE PRENATAL (OUTPATIENT)
Dept: OBSTETRICS AND GYNECOLOGY | Facility: CLINIC | Age: 24
End: 2024-05-16
Payer: COMMERCIAL

## 2024-05-16 VITALS — SYSTOLIC BLOOD PRESSURE: 100 MMHG | BODY MASS INDEX: 31.73 KG/M2 | DIASTOLIC BLOOD PRESSURE: 60 MMHG | WEIGHT: 196.6 LBS

## 2024-05-16 DIAGNOSIS — Z34.92 SECOND TRIMESTER PREGNANCY: Primary | ICD-10-CM

## 2024-05-16 NOTE — PROGRESS NOTES
Chief Complaint   Patient presents with    Routine Prenatal Visit     Prenatal visit with Anatomy scan done today.  No problems or concerns.        HPI:   , 18w1d gestation reports doing well    ROS:  See Prenatal Episode/Flowsheet  /60   Wt 89.2 kg (196 lb 9.6 oz)   LMP 01/10/2024   BMI 31.73 kg/m²      EXAM:  EXTREMITIES:  No swelling-See Prenatal Episode/Flowsheet    ABDOMEN:  FHTs/Movement noted-See Prenatal Episode/Flowsheet    URINE GLUCOSE/PROTEIN:  See Prenatal Episode/Flowsheet    PELVIC EXAM:  See Prenatal Episode/Flowsheet  CV:  Lungs:  GYN:    MDM:    Lab Results   Component Value Date    HGB 13.3 2024    RUBELLAABIGG 1.02 2024    HEPBSAG Negative 2024    ABO O 2024    RH Positive 2024    ABSCRN Negative 2024    [id number] Non Reactive 2024    HEPCVIRUSABY Non Reactive 2024       U/S: anatomy within normal limits.  Size is consistent with dates.  Suboptimal views secondary to fetal positioning.  Vertex.  Posterior placenta.  Three-vessel cord.  1. IUP 18w1d  2. Routine care   3. Repeat views next time

## 2024-06-17 ENCOUNTER — ROUTINE PRENATAL (OUTPATIENT)
Dept: OBSTETRICS AND GYNECOLOGY | Facility: CLINIC | Age: 24
End: 2024-06-17
Payer: COMMERCIAL

## 2024-06-17 VITALS — BODY MASS INDEX: 32.93 KG/M2 | DIASTOLIC BLOOD PRESSURE: 64 MMHG | SYSTOLIC BLOOD PRESSURE: 106 MMHG | WEIGHT: 204 LBS

## 2024-06-17 DIAGNOSIS — Z36.2 ENCOUNTER FOR FOLLOW-UP ULTRASOUND OF FETAL ANATOMY: Primary | ICD-10-CM

## 2024-06-17 PROCEDURE — 99213 OFFICE O/P EST LOW 20 MIN: CPT | Performed by: OBSTETRICS & GYNECOLOGY

## 2024-06-17 NOTE — PROGRESS NOTES
Chief Complaint   Patient presents with    Routine Prenatal Visit     Prenatal visit with Scan done today. No problems or concerns        HPI:   , 22w5d gestation reports doing well    ROS:  See Prenatal Episode/Flowsheet  /64   Wt 92.5 kg (204 lb)   LMP 01/10/2024   BMI 32.93 kg/m²      EXAM:  EXTREMITIES:  No swelling-See Prenatal Episode/Flowsheet    ABDOMEN:  FHTs/Movement noted-See Prenatal Episode/Flowsheet    URINE GLUCOSE/PROTEIN:  See Prenatal Episode/Flowsheet    PELVIC EXAM:  See Prenatal Episode/Flowsheet  CV:  Lungs:  GYN:    MDM:    Lab Results   Component Value Date    HGB 13.3 2024    RUBELLAABIGG 1.02 2024    HEPBSAG Negative 2024    ABO O 2024    RH Positive 2024    ABSCRN Negative 2024    [id number] Non Reactive 2024    HEPCVIRUSABY Non Reactive 2024       U/S: Normal cardiac views and outflow tracts.  Normal profile    1. IUP 22w5d  2. Routine care   3.  Glucola next time   Per patient history of A1 gestational diabetes.

## 2024-07-05 ENCOUNTER — ROUTINE PRENATAL (OUTPATIENT)
Dept: OBSTETRICS AND GYNECOLOGY | Facility: CLINIC | Age: 24
End: 2024-07-05
Payer: COMMERCIAL

## 2024-07-05 VITALS — WEIGHT: 211 LBS | BODY MASS INDEX: 34.06 KG/M2 | DIASTOLIC BLOOD PRESSURE: 64 MMHG | SYSTOLIC BLOOD PRESSURE: 112 MMHG

## 2024-07-05 DIAGNOSIS — Z34.82 ENCOUNTER FOR SUPERVISION OF OTHER NORMAL PREGNANCY IN SECOND TRIMESTER: Primary | ICD-10-CM

## 2024-07-05 LAB
BASOPHILS # BLD AUTO: 0.03 10*3/MM3 (ref 0–0.2)
BASOPHILS NFR BLD AUTO: 0.4 % (ref 0–1.5)
EOSINOPHIL # BLD AUTO: 0.04 10*3/MM3 (ref 0–0.4)
EOSINOPHIL NFR BLD AUTO: 0.5 % (ref 0.3–6.2)
ERYTHROCYTE [DISTWIDTH] IN BLOOD BY AUTOMATED COUNT: 12.6 % (ref 12.3–15.4)
GLUCOSE 1H P 50 G GLC PO SERPL-MCNC: 135 MG/DL (ref 65–139)
HCT VFR BLD AUTO: 36.6 % (ref 34–46.6)
HGB BLD-MCNC: 12 G/DL (ref 12–15.9)
IMM GRANULOCYTES # BLD AUTO: 0.05 10*3/MM3 (ref 0–0.05)
IMM GRANULOCYTES NFR BLD AUTO: 0.6 % (ref 0–0.5)
LYMPHOCYTES # BLD AUTO: 1.39 10*3/MM3 (ref 0.7–3.1)
LYMPHOCYTES NFR BLD AUTO: 16.8 % (ref 19.6–45.3)
MCH RBC QN AUTO: 29.3 PG (ref 26.6–33)
MCHC RBC AUTO-ENTMCNC: 32.8 G/DL (ref 31.5–35.7)
MCV RBC AUTO: 89.3 FL (ref 79–97)
MONOCYTES # BLD AUTO: 0.51 10*3/MM3 (ref 0.1–0.9)
MONOCYTES NFR BLD AUTO: 6.2 % (ref 5–12)
NEUTROPHILS # BLD AUTO: 6.26 10*3/MM3 (ref 1.7–7)
NEUTROPHILS NFR BLD AUTO: 75.5 % (ref 42.7–76)
NRBC BLD AUTO-RTO: 0 /100 WBC (ref 0–0.2)
PLATELET # BLD AUTO: 201 10*3/MM3 (ref 140–450)
RBC # BLD AUTO: 4.1 10*6/MM3 (ref 3.77–5.28)
WBC # BLD AUTO: 8.28 10*3/MM3 (ref 3.4–10.8)

## 2024-07-05 NOTE — PROGRESS NOTES
Chief Complaint   Patient presents with    Routine Prenatal Visit     Glucola today        HPI: Kaitlin is a  currently at 25w2d here for prenatal visit who reports the following: baby is active. She is doing glucola today. She did have GDM A1 with first pregnancy. Nausea has resolved                EXAM:     Vitals:    24 1019   BP: 112/64      Abdomen:   See prenatal flowsheet as noted and reviewed, soft, nontender   Pelvic:  See prenatal flowsheet as noted and reviewed   Urine:  See prenatal flowsheet as noted and reviewed    Lab Results   Component Value Date    ABO O 2024    RH Positive 2024    ABSCRN Negative 2024       MDM:  Impression: Supervision of low risk pregnancy   Tests done today: GCT  HgB   Topics discussed: glucose management  kick counts and fetal movement  Reviewed OB labs   Tests next visit: none                RTO:                        4 weeks    This note was electronically signed.  Mikaela Galvan, NABOR  2024

## 2024-08-05 ENCOUNTER — ROUTINE PRENATAL (OUTPATIENT)
Dept: OBSTETRICS AND GYNECOLOGY | Facility: CLINIC | Age: 24
End: 2024-08-05
Payer: COMMERCIAL

## 2024-08-05 VITALS — BODY MASS INDEX: 35.35 KG/M2 | WEIGHT: 219 LBS | SYSTOLIC BLOOD PRESSURE: 112 MMHG | DIASTOLIC BLOOD PRESSURE: 60 MMHG

## 2024-08-05 DIAGNOSIS — K21.9 GASTROESOPHAGEAL REFLUX DISEASE WITHOUT ESOPHAGITIS: ICD-10-CM

## 2024-08-05 DIAGNOSIS — Z86.32 HISTORY OF GESTATIONAL DIABETES: ICD-10-CM

## 2024-08-05 DIAGNOSIS — Z34.83 ENCOUNTER FOR SUPERVISION OF OTHER NORMAL PREGNANCY, THIRD TRIMESTER: Primary | ICD-10-CM

## 2024-08-05 PROCEDURE — 99214 OFFICE O/P EST MOD 30 MIN: CPT | Performed by: OBSTETRICS & GYNECOLOGY

## 2024-08-05 RX ORDER — FAMOTIDINE 20 MG/1
20 TABLET, FILM COATED ORAL 2 TIMES DAILY
Qty: 60 TABLET | Refills: 5 | Status: SHIPPED | OUTPATIENT
Start: 2024-08-05

## 2024-08-06 NOTE — PROGRESS NOTES
Chief Complaint  Routine Prenatal Visit (No complaints. )    History of Present Illness:  Kaitlin is a  currently at 29w6d who presents today with complaints of reflux.  Patient has been taking Tums.  She is continue to have symptoms.  She does report good fetal movement.  She denies any significant cramping or contractions.  She did have her Glucola and CBC last visit.  She does have a history of gestational diabetes with her first pregnancy.    Exam:  Vitals:  See prenatal flowsheet as noted and reviewed  General: Alert, cooperative, and does not appear in any distress  Abdomen:   See prenatal flowsheet as noted and reviewed    Uterus gravid, non-tender; no palpable masses    No guarding or rebound tenderness  Pelvic:  See prenatal flowsheet as noted and reviewed  Ext:  See prenatal flowsheet as noted and reviewed    Moves extremities well, no cyanosis and no redness  Urine:  See prenatal flowsheet as noted and reviewed    Data Review:  The following data was reviewed by: Archana Gray MD on 2024:  Prenatal Labs:  Lab Results   Component Value Date    HGB 12.0 2024    RUBELLAABIGG 1.02 2024    HEPBSAG Negative 2024    ABO O 2024    RH Positive 2024    ABSCRN Negative 2024    [id number] Non Reactive 2024    HEPCVIRUSABY Non Reactive 2024       No visits with results within 1 Month(s) from this visit.   Latest known visit with results is:   Routine Prenatal on 2024   Component Date Value    Gestational Diabetes Scr* 2024 135     WBC 2024 8.28     RBC 2024 4.10     Hemoglobin 2024 12.0     Hematocrit 2024 36.6     MCV 2024 89.3     MCH 2024 29.3     MCHC 2024 32.8     RDW 2024 12.6     Platelets 2024 201     Neutrophil Rel % 2024 75.5     Lymphocyte Rel % 2024 16.8 (L)     Monocyte Rel % 2024 6.2     Eosinophil Rel % 2024 0.5     Basophil Rel % 2024 0.4      Neutrophils Absolute 2024 6.26     Lymphocytes Absolute 2024 1.39     Monocytes Absolute 2024 0.51     Eosinophils Absolute 2024 0.04     Basophils Absolute 2024 0.03     Immature Granulocyte Rel* 2024 0.6 (H)     Immature Grans Absolute 2024 0.05     nRBC 2024 0.0      Imaging:  US Ob Follow Up Transabdominal Approach  Normal profile and cardiac views as well as outflow tracts active fetus in   the vertex position     Medical Records:  None    Assessment and Plan:  Problem List Items Addressed This Visit    None  Visit Diagnoses       Encounter for supervision of other normal pregnancy, third trimester    -  Primary  Topics discussed:     GERD management  kick counts and fetal movement  PIH precautions   labor signs and symptoms  Follow-up scan for growth    Relevant Orders    US Ob Follow Up Transabdominal Approach    Gastroesophageal reflux disease without esophagitis      Prescription given for Pepcid as noted.  Instructions and precautions have been given.    Relevant Medications    famotidine (PEPCID) 20 MG tablet    History of gestational diabetes      Patient's previous labs reviewed as noted.  Will monitor for growth as discussed.  Glucola 135.          Follow Up/Instructions:  Follow up as scheduled.  Patient was given instructions and counseling regarding her condition or for health maintenance advice. Please see specific information pulled into the AVS if appropriate.     Note: Speech recognition transcription software may have been used to dictate portions of this document.  An attempt at proofreading has been made though minor errors in transcription may still be present.    This note was electronically signed.  Archana Gray M.D.

## 2024-08-21 ENCOUNTER — ROUTINE PRENATAL (OUTPATIENT)
Dept: OBSTETRICS AND GYNECOLOGY | Facility: CLINIC | Age: 24
End: 2024-08-21
Payer: COMMERCIAL

## 2024-08-21 VITALS — SYSTOLIC BLOOD PRESSURE: 122 MMHG | WEIGHT: 223 LBS | BODY MASS INDEX: 35.99 KG/M2 | DIASTOLIC BLOOD PRESSURE: 58 MMHG

## 2024-08-21 DIAGNOSIS — Z34.83 ENCOUNTER FOR SUPERVISION OF OTHER NORMAL PREGNANCY, THIRD TRIMESTER: Primary | ICD-10-CM

## 2024-08-21 DIAGNOSIS — K21.9 GASTROESOPHAGEAL REFLUX IN PREGNANCY: ICD-10-CM

## 2024-08-21 DIAGNOSIS — O99.619 GASTROESOPHAGEAL REFLUX IN PREGNANCY: ICD-10-CM

## 2024-08-21 PROCEDURE — 99213 OFFICE O/P EST LOW 20 MIN: CPT | Performed by: STUDENT IN AN ORGANIZED HEALTH CARE EDUCATION/TRAINING PROGRAM

## 2024-08-21 NOTE — PROGRESS NOTES
Prenatal Care Visit    Subjective   Chief Complaint   Patient presents with    Pregnancy Ultrasound     Growth scan done today. No concerns.      History:   Kaitlin is a  currently at 32w0d who presents for a prenatal care visit today.    Reports some tightening and cramping. Denies VB, LOF. Reports (+) FM.     Objective   /58   Wt 101 kg (223 lb)   LMP 01/10/2024   BMI 35.99 kg/m²   Physical Exam:  Normal, gestational age-appropriate exam today      Assessment & Plan     IUP @ 32w0d  Routine care: I have reviewed the prenatal labs and ultrasound(s) today. I have reviewed the most recent prenatal progress note(s). Growth sono today - EFW 1905g (43%), AC 56%, vertex, TARSHA 21.1 cm.  GERD: well controlled with pepcid     Diagnosis Plan   1. Encounter for supervision of other normal pregnancy, third trimester        2. Gastroesophageal reflux in pregnancy           Medication Management: continue PNV, pepcid    Topics discussed: Prenatal care milestones  Kick counts and fetal movement   labor signs and symptoms   Tests next visit: none   Next visit: 2 week(s)     Mehnaz Ho MD  Obstetrics and Gynecology  [facility]

## 2024-09-03 ENCOUNTER — ROUTINE PRENATAL (OUTPATIENT)
Dept: OBSTETRICS AND GYNECOLOGY | Facility: CLINIC | Age: 24
End: 2024-09-03
Payer: COMMERCIAL

## 2024-09-03 VITALS — WEIGHT: 225 LBS | DIASTOLIC BLOOD PRESSURE: 72 MMHG | BODY MASS INDEX: 36.32 KG/M2 | SYSTOLIC BLOOD PRESSURE: 128 MMHG

## 2024-09-03 DIAGNOSIS — K21.9 GASTROESOPHAGEAL REFLUX IN PREGNANCY: ICD-10-CM

## 2024-09-03 DIAGNOSIS — O99.619 GASTROESOPHAGEAL REFLUX IN PREGNANCY: ICD-10-CM

## 2024-09-03 DIAGNOSIS — Z34.83 ENCOUNTER FOR SUPERVISION OF OTHER NORMAL PREGNANCY, THIRD TRIMESTER: Primary | ICD-10-CM

## 2024-09-03 DIAGNOSIS — Z86.32 HISTORY OF GESTATIONAL DIABETES: ICD-10-CM

## 2024-09-03 PROCEDURE — 99214 OFFICE O/P EST MOD 30 MIN: CPT | Performed by: OBSTETRICS & GYNECOLOGY

## 2024-09-03 NOTE — PROGRESS NOTES
Chief Complaint  Routine Prenatal Visit (No complaints. )    History of Present Illness:  Kaitlin is a  currently at 33w6d who presents today with no significant complaints.  Patient does report good fetal movement.  She denies any significant cramping or contractions.  She has not had any vaginal bleeding or spotting.  She did have previous scan for growth.  She has been taking her Pepcid for reflux.  She continues to have some reflux but not severe in nature.  She has been taking her prenatal vitamins.    Exam:  Vitals:  See prenatal flowsheet as noted and reviewed  General: Alert, cooperative, and does not appear in any distress  Abdomen:   See prenatal flowsheet as noted and reviewed    Uterus gravid, non-tender; no palpable masses    No guarding or rebound tenderness  Pelvic:  See prenatal flowsheet as noted and reviewed  Ext:  See prenatal flowsheet as noted and reviewed    Moves extremities well, no cyanosis and no redness  Urine:  See prenatal flowsheet as noted and reviewed    Data Review:  The following data was reviewed by: Archana Gray MD on 2024:  Prenatal Labs:  Lab Results   Component Value Date    HGB 12.0 2024    RUBELLAABIGG 1.02 2024    HEPBSAG Negative 2024    ABO O 2024    RH Positive 2024    ABSCRN Negative 2024    DGN1SLE0 Non Reactive 2024    HEPCVIRUSABY Non Reactive 2024       No visits with results within 1 Month(s) from this visit.   Latest known visit with results is:   Routine Prenatal on 2024   Component Date Value    Gestational Diabetes Scr* 2024 135     WBC 2024 8.28     RBC 2024 4.10     Hemoglobin 2024 12.0     Hematocrit 2024 36.6     MCV 2024 89.3     MCH 2024 29.3     MCHC 2024 32.8     RDW 2024 12.6     Platelets 2024 201     Neutrophil Rel % 2024 75.5     Lymphocyte Rel % 2024 16.8 (L)     Monocyte Rel % 2024 6.2     Eosinophil Rel  % 2024 0.5     Basophil Rel % 2024 0.4     Neutrophils Absolute 2024 6.26     Lymphocytes Absolute 2024 1.39     Monocytes Absolute 2024 0.51     Eosinophils Absolute 2024 0.04     Basophils Absolute 2024 0.03     Immature Granulocyte Rel* 2024 0.6 (H)     Immature Grans Absolute 2024 0.05     nRBC 2024 0.0      Imaging:  US Ob Follow Up Transabdominal Approach  Impression:   IUP at 32w0d. Limited anatomy was reviewed today and is normal in   appearance. EFW 1905g (43%) AC 56%. Cephalic presentation. Placenta is   posterior. The amniotic fluid measures 21.1 cm and the fetal heart rate is   normal.    Mehnaz Ho MD   Obstetrics and Gynecology  [facility]       Medical Records:  None    Assessment and Plan:  Problem List Items Addressed This Visit    None  Visit Diagnoses       Encounter for supervision of other normal pregnancy, third trimester    -  Primary  Topics discussed:     GERD management  iron supplementation  kick counts and fetal movement  PIH precautions   labor signs and symptoms  Follow-up scan for growth as noted    Relevant Orders    US Ob Follow Up Transabdominal Approach    Gastroesophageal reflux in pregnancy      Patient to continue her Pepcid as previously given.    History of gestational diabetes      Previous 1 hour Glucola 135.  Patient with history of gestational diabetes.  Follow-up scan as noted.          Follow Up/Instructions:  Follow up as scheduled.  Patient was given instructions and counseling regarding her condition or for health maintenance advice. Please see specific information pulled into the AVS if appropriate.     Note: Speech recognition transcription software may have been used to dictate portions of this document.  An attempt at proofreading has been made though minor errors in transcription may still be present.    This note was electronically signed.  Archana Gray M.D.

## 2024-09-16 ENCOUNTER — ROUTINE PRENATAL (OUTPATIENT)
Dept: OBSTETRICS AND GYNECOLOGY | Facility: CLINIC | Age: 24
End: 2024-09-16
Payer: COMMERCIAL

## 2024-09-16 VITALS — WEIGHT: 236.8 LBS | DIASTOLIC BLOOD PRESSURE: 64 MMHG | BODY MASS INDEX: 38.22 KG/M2 | SYSTOLIC BLOOD PRESSURE: 100 MMHG

## 2024-09-16 DIAGNOSIS — Z34.93 THIRD TRIMESTER PREGNANCY: Primary | ICD-10-CM

## 2024-09-16 PROCEDURE — 99213 OFFICE O/P EST LOW 20 MIN: CPT | Performed by: OBSTETRICS & GYNECOLOGY

## 2024-09-23 ENCOUNTER — ROUTINE PRENATAL (OUTPATIENT)
Dept: OBSTETRICS AND GYNECOLOGY | Facility: CLINIC | Age: 24
End: 2024-09-23
Payer: COMMERCIAL

## 2024-09-23 VITALS — DIASTOLIC BLOOD PRESSURE: 60 MMHG | WEIGHT: 241.1 LBS | SYSTOLIC BLOOD PRESSURE: 104 MMHG | BODY MASS INDEX: 38.91 KG/M2

## 2024-09-23 DIAGNOSIS — Z36.85 ENCOUNTER FOR ANTENATAL SCREENING FOR STREPTOCOCCUS B: Primary | ICD-10-CM

## 2024-09-23 DIAGNOSIS — K21.9 GASTROESOPHAGEAL REFLUX IN PREGNANCY: ICD-10-CM

## 2024-09-23 DIAGNOSIS — Z34.83 ENCOUNTER FOR SUPERVISION OF OTHER NORMAL PREGNANCY, THIRD TRIMESTER: ICD-10-CM

## 2024-09-23 DIAGNOSIS — O99.619 GASTROESOPHAGEAL REFLUX IN PREGNANCY: ICD-10-CM

## 2024-09-23 PROCEDURE — 99214 OFFICE O/P EST MOD 30 MIN: CPT | Performed by: OBSTETRICS & GYNECOLOGY

## 2024-09-28 LAB
CLINDAMYCIN ISLT KB: NORMAL
GP B STREP DNA SPEC QL NAA+PROBE: POSITIVE
ORGANISM ID: NORMAL

## 2024-09-30 ENCOUNTER — ROUTINE PRENATAL (OUTPATIENT)
Dept: OBSTETRICS AND GYNECOLOGY | Facility: CLINIC | Age: 24
End: 2024-09-30
Payer: COMMERCIAL

## 2024-09-30 VITALS — SYSTOLIC BLOOD PRESSURE: 104 MMHG | DIASTOLIC BLOOD PRESSURE: 62 MMHG | WEIGHT: 238 LBS | BODY MASS INDEX: 38.41 KG/M2

## 2024-09-30 DIAGNOSIS — Z34.93 PRENATAL CARE IN THIRD TRIMESTER: Primary | ICD-10-CM

## 2024-09-30 DIAGNOSIS — O99.820 GBS (GROUP B STREPTOCOCCUS CARRIER), +RV CULTURE, CURRENTLY PREGNANT: ICD-10-CM

## 2024-09-30 PROCEDURE — 99213 OFFICE O/P EST LOW 20 MIN: CPT | Performed by: OBSTETRICS & GYNECOLOGY

## 2024-09-30 NOTE — PROGRESS NOTES
Prenatal Care Visit    Subjective   Chief Complaint   Patient presents with    Routine Prenatal Visit     No complaints       History:   Kaitlin is a  currently at 37w5d who presents for a prenatal care visit today.    No issues.    Social History    Tobacco Use      Smoking status: Passive Smoke Exposure - Never Smoker      Smokeless tobacco: Never       Objective   /62   Wt 108 kg (238 lb)   LMP 01/10/2024   BMI 38.41 kg/m²   Physical Exam:  Normal, gestational age-appropriate exam today        Plan   Medical Decision Making:    I have reviewed the prenatal labs and ultrasound(s) today. I have reviewed the most recent prenatal progress note(s).    Diagnosis: Supervision of low risk pregnancy  GBS+   Tests/Orders/Rx today: No orders of the defined types were placed in this encounter.      Medication Management: none     Topics discussed: Prenatal care milestones  induction of labor  kick counts and fetal movement  labor signs and symptoms  PIH precautions  GBS   Tests next visit: none   Next visit: 1 week(s)     Justice Lomas MD  Obstetrics and Gynecology  [facility]

## 2024-10-07 ENCOUNTER — PREP FOR SURGERY (OUTPATIENT)
Dept: OTHER | Facility: HOSPITAL | Age: 24
End: 2024-10-07
Payer: COMMERCIAL

## 2024-10-07 ENCOUNTER — ROUTINE PRENATAL (OUTPATIENT)
Dept: OBSTETRICS AND GYNECOLOGY | Facility: CLINIC | Age: 24
End: 2024-10-07
Payer: COMMERCIAL

## 2024-10-07 VITALS — WEIGHT: 245 LBS | SYSTOLIC BLOOD PRESSURE: 114 MMHG | BODY MASS INDEX: 39.54 KG/M2 | DIASTOLIC BLOOD PRESSURE: 64 MMHG

## 2024-10-07 DIAGNOSIS — Z34.93 PRENATAL CARE IN THIRD TRIMESTER: Primary | ICD-10-CM

## 2024-10-07 DIAGNOSIS — O99.820 GBS (GROUP B STREPTOCOCCUS CARRIER), +RV CULTURE, CURRENTLY PREGNANT: ICD-10-CM

## 2024-10-07 DIAGNOSIS — Z34.90 ENCOUNTER FOR INDUCTION OF LABOR: Primary | ICD-10-CM

## 2024-10-07 PROCEDURE — 99213 OFFICE O/P EST LOW 20 MIN: CPT | Performed by: OBSTETRICS & GYNECOLOGY

## 2024-10-07 RX ORDER — LIDOCAINE HYDROCHLORIDE 10 MG/ML
0.5 INJECTION, SOLUTION INFILTRATION; PERINEURAL ONCE AS NEEDED
OUTPATIENT
Start: 2024-10-07

## 2024-10-07 RX ORDER — HYDROXYZINE HYDROCHLORIDE 25 MG/1
50 TABLET, FILM COATED ORAL NIGHTLY PRN
OUTPATIENT
Start: 2024-10-07

## 2024-10-07 RX ORDER — OXYTOCIN/0.9 % SODIUM CHLORIDE 30/500 ML
250 PLASTIC BAG, INJECTION (ML) INTRAVENOUS CONTINUOUS
OUTPATIENT
Start: 2024-10-07 | End: 2024-10-07

## 2024-10-07 RX ORDER — PROMETHAZINE HYDROCHLORIDE 12.5 MG/1
12.5 TABLET ORAL EVERY 6 HOURS PRN
OUTPATIENT
Start: 2024-10-07

## 2024-10-07 RX ORDER — OXYTOCIN/0.9 % SODIUM CHLORIDE 30/500 ML
999 PLASTIC BAG, INJECTION (ML) INTRAVENOUS ONCE
OUTPATIENT
Start: 2024-10-07 | End: 2024-10-07

## 2024-10-07 RX ORDER — ONDANSETRON 2 MG/ML
4 INJECTION INTRAMUSCULAR; INTRAVENOUS EVERY 6 HOURS PRN
OUTPATIENT
Start: 2024-10-07

## 2024-10-07 RX ORDER — HYDROCODONE BITARTRATE AND ACETAMINOPHEN 5; 325 MG/1; MG/1
1 TABLET ORAL EVERY 4 HOURS PRN
OUTPATIENT
Start: 2024-10-07 | End: 2024-10-14

## 2024-10-07 RX ORDER — OXYTOCIN/0.9 % SODIUM CHLORIDE 30/500 ML
2-20 PLASTIC BAG, INJECTION (ML) INTRAVENOUS
OUTPATIENT
Start: 2024-10-07

## 2024-10-07 RX ORDER — ACETAMINOPHEN 325 MG/1
650 TABLET ORAL ONCE AS NEEDED
OUTPATIENT
Start: 2024-10-07

## 2024-10-07 RX ORDER — ONDANSETRON 2 MG/ML
4 INJECTION INTRAMUSCULAR; INTRAVENOUS ONCE AS NEEDED
OUTPATIENT
Start: 2024-10-07

## 2024-10-07 RX ORDER — SODIUM CHLORIDE 0.9 % (FLUSH) 0.9 %
10 SYRINGE (ML) INJECTION AS NEEDED
OUTPATIENT
Start: 2024-10-07

## 2024-10-07 RX ORDER — MORPHINE SULFATE 2 MG/ML
2 INJECTION, SOLUTION INTRAMUSCULAR; INTRAVENOUS ONCE AS NEEDED
OUTPATIENT
Start: 2024-10-07

## 2024-10-07 RX ORDER — MISOPROSTOL 200 UG/1
800 TABLET ORAL ONCE AS NEEDED
OUTPATIENT
Start: 2024-10-07 | End: 2024-10-08

## 2024-10-07 RX ORDER — CARBOPROST TROMETHAMINE 250 UG/ML
250 INJECTION, SOLUTION INTRAMUSCULAR ONCE AS NEEDED
OUTPATIENT
Start: 2024-10-07 | End: 2024-10-08

## 2024-10-07 RX ORDER — ACETAMINOPHEN 325 MG/1
650 TABLET ORAL EVERY 4 HOURS PRN
OUTPATIENT
Start: 2024-10-07

## 2024-10-07 RX ORDER — ONDANSETRON 4 MG/1
4 TABLET, ORALLY DISINTEGRATING ORAL EVERY 6 HOURS PRN
OUTPATIENT
Start: 2024-10-07

## 2024-10-07 RX ORDER — PROMETHAZINE HYDROCHLORIDE 12.5 MG/1
12.5 SUPPOSITORY RECTAL EVERY 6 HOURS PRN
OUTPATIENT
Start: 2024-10-07

## 2024-10-07 RX ORDER — ONDANSETRON 4 MG/1
4 TABLET, ORALLY DISINTEGRATING ORAL ONCE AS NEEDED
OUTPATIENT
Start: 2024-10-07

## 2024-10-07 RX ORDER — SODIUM CHLORIDE 0.9 % (FLUSH) 0.9 %
10 SYRINGE (ML) INJECTION EVERY 12 HOURS SCHEDULED
OUTPATIENT
Start: 2024-10-07

## 2024-10-07 RX ORDER — METHYLERGONOVINE MALEATE 0.2 MG/ML
200 INJECTION INTRAVENOUS ONCE AS NEEDED
OUTPATIENT
Start: 2024-10-07 | End: 2024-10-08

## 2024-10-08 NOTE — PROGRESS NOTES
Prenatal Care Visit    Subjective   Chief Complaint   Patient presents with    Routine Prenatal Visit     Patient complains of contractions.        History:   Kaitlin is a  currently at 38w5d who presents for a prenatal care visit today.    No issues.    Social History    Tobacco Use      Smoking status: Passive Smoke Exposure - Never Smoker      Smokeless tobacco: Never       Objective   /64   Wt 111 kg (245 lb)   LMP 01/10/2024   BMI 39.54 kg/m²   Physical Exam:  Normal, gestational age-appropriate exam today   SVE 1/25/-3       Plan   Medical Decision Making:    I have reviewed the prenatal labs and ultrasound(s) today. I have reviewed the most recent prenatal progress note(s).    Diagnosis: Supervision of low risk pregnancy  GBS+   Tests/Orders/Rx today: No orders of the defined types were placed in this encounter.      Medication Management: none     Topics discussed: Prenatal care milestones  induction of labor  kick counts and fetal movement  labor signs and symptoms  PIH precautions  GBS   Tests next visit: none   Next visit: 1 week(s)     Justice Lomas MD  Obstetrics and Gynecology  [facility]

## 2024-10-14 ENCOUNTER — ROUTINE PRENATAL (OUTPATIENT)
Dept: OBSTETRICS AND GYNECOLOGY | Facility: CLINIC | Age: 24
End: 2024-10-14
Payer: COMMERCIAL

## 2024-10-14 VITALS — DIASTOLIC BLOOD PRESSURE: 70 MMHG | WEIGHT: 244 LBS | BODY MASS INDEX: 39.38 KG/M2 | SYSTOLIC BLOOD PRESSURE: 110 MMHG

## 2024-10-14 DIAGNOSIS — O99.820 GBS (GROUP B STREPTOCOCCUS CARRIER), +RV CULTURE, CURRENTLY PREGNANT: ICD-10-CM

## 2024-10-14 DIAGNOSIS — Z34.83 ENCOUNTER FOR SUPERVISION OF OTHER NORMAL PREGNANCY IN THIRD TRIMESTER: Primary | ICD-10-CM

## 2024-10-14 PROCEDURE — 99213 OFFICE O/P EST LOW 20 MIN: CPT | Performed by: MIDWIFE

## 2024-10-14 NOTE — PROGRESS NOTES
Chief Complaint   Patient presents with    Routine Prenatal Visit       HPI: Kaitlin is a  currently at 39w5d here for prenatal visit who reports the following:  Baby is active. She isn't having regular ctxs. She is set up for an induction the end of the week.                EXAM:     Vitals:    10/14/24 1544   BP: 110/70      Abdomen:   See prenatal flowsheet as noted and reviewed, soft, nontender   Pelvic:  1/30/-3 posterior soft   Urine:  See prenatal flowsheet as noted and reviewed    Lab Results   Component Value Date    ABO O 2024    RH Positive 2024    ABSCRN Negative 2024       MDM:  Impression: Supervision of low risk pregnancy  GBS (+)   Tests done today: none   Topics discussed: induction of labor  kick counts and fetal movement  labor signs and symptoms  Reviewed OB labs   Tests next visit: none                RTO:                         Induction 10/17    This note was electronically signed.  Mikaela Galvan, NABOR  10/14/2024

## 2024-10-15 ENCOUNTER — HOSPITAL ENCOUNTER (OUTPATIENT)
Facility: HOSPITAL | Age: 24
Discharge: HOME | End: 2024-10-15
Attending: MIDWIFE | Admitting: MIDWIFE
Payer: COMMERCIAL

## 2024-10-15 VITALS — HEART RATE: 86 BPM | DIASTOLIC BLOOD PRESSURE: 59 MMHG | OXYGEN SATURATION: 97 % | SYSTOLIC BLOOD PRESSURE: 110 MMHG

## 2024-10-15 LAB
BACTERIA UR QL AUTO: ABNORMAL /HPF
BILIRUB BLD-MCNC: NEGATIVE MG/DL
BILIRUB UR QL STRIP: NEGATIVE
CLARITY UR: CLEAR
CLARITY, POC: CLEAR
COLOR UR: YELLOW
COLOR UR: YELLOW
GLUCOSE UR STRIP-MCNC: NEGATIVE MG/DL
GLUCOSE UR STRIP-MCNC: NEGATIVE MG/DL
HGB UR QL STRIP.AUTO: ABNORMAL
HYALINE CASTS UR QL AUTO: ABNORMAL /LPF
KETONES UR QL STRIP: ABNORMAL
KETONES UR QL: ABNORMAL
LEUKOCYTE EST, POC: ABNORMAL
LEUKOCYTE ESTERASE UR QL STRIP.AUTO: ABNORMAL
NITRITE UR QL STRIP: POSITIVE
NITRITE UR-MCNC: POSITIVE MG/ML
PH UR STRIP.AUTO: 6 [PH] (ref 5–8)
PH UR: 6 [PH] (ref 5–8)
PROT UR QL STRIP: NEGATIVE
PROT UR STRIP-MCNC: ABNORMAL MG/DL
RBC # UR STRIP: ABNORMAL /HPF
RBC # UR STRIP: ABNORMAL /UL
REF LAB TEST METHOD: ABNORMAL
SP GR UR STRIP: 1.01 (ref 1–1.03)
SP GR UR: 1.01 (ref 1–1.03)
SQUAMOUS #/AREA URNS HPF: ABNORMAL /HPF
UROBILINOGEN UR QL STRIP: ABNORMAL
UROBILINOGEN UR QL: NORMAL
WBC # UR STRIP: ABNORMAL /HPF

## 2024-10-15 PROCEDURE — 81002 URINALYSIS NONAUTO W/O SCOPE: CPT | Performed by: MIDWIFE

## 2024-10-15 PROCEDURE — 59025 FETAL NON-STRESS TEST: CPT

## 2024-10-15 PROCEDURE — 87086 URINE CULTURE/COLONY COUNT: CPT | Performed by: MIDWIFE

## 2024-10-15 PROCEDURE — 87186 SC STD MICRODIL/AGAR DIL: CPT | Performed by: MIDWIFE

## 2024-10-15 PROCEDURE — 87088 URINE BACTERIA CULTURE: CPT | Performed by: MIDWIFE

## 2024-10-15 PROCEDURE — 81001 URINALYSIS AUTO W/SCOPE: CPT | Performed by: MIDWIFE

## 2024-10-15 PROCEDURE — G0463 HOSPITAL OUTPT CLINIC VISIT: HCPCS

## 2024-10-15 PROCEDURE — 59025 FETAL NON-STRESS TEST: CPT | Performed by: MIDWIFE

## 2024-10-15 RX ORDER — SODIUM CHLORIDE 0.9 % (FLUSH) 0.9 %
10 SYRINGE (ML) INJECTION EVERY 12 HOURS SCHEDULED
Status: DISCONTINUED | OUTPATIENT
Start: 2024-10-15 | End: 2024-10-15 | Stop reason: HOSPADM

## 2024-10-15 RX ORDER — NITROFURANTOIN 25; 75 MG/1; MG/1
100 CAPSULE ORAL EVERY 12 HOURS SCHEDULED
Status: DISCONTINUED | OUTPATIENT
Start: 2024-10-15 | End: 2024-10-15

## 2024-10-15 RX ORDER — SODIUM CHLORIDE 9 MG/ML
40 INJECTION, SOLUTION INTRAVENOUS AS NEEDED
Status: DISCONTINUED | OUTPATIENT
Start: 2024-10-15 | End: 2024-10-15 | Stop reason: HOSPADM

## 2024-10-15 RX ORDER — LIDOCAINE HYDROCHLORIDE 10 MG/ML
0.5 INJECTION, SOLUTION INFILTRATION; PERINEURAL ONCE AS NEEDED
Status: DISCONTINUED | OUTPATIENT
Start: 2024-10-15 | End: 2024-10-15 | Stop reason: HOSPADM

## 2024-10-15 RX ORDER — NITROFURANTOIN 25; 75 MG/1; MG/1
100 CAPSULE ORAL 2 TIMES DAILY
Qty: 13 CAPSULE | Refills: 0 | Status: ON HOLD | OUTPATIENT
Start: 2024-10-15 | End: 2024-10-17

## 2024-10-15 RX ORDER — SODIUM CHLORIDE 0.9 % (FLUSH) 0.9 %
10 SYRINGE (ML) INJECTION AS NEEDED
Status: DISCONTINUED | OUTPATIENT
Start: 2024-10-15 | End: 2024-10-15 | Stop reason: HOSPADM

## 2024-10-15 RX ADMIN — AMOXICILLIN AND CLAVULANATE POTASSIUM 1 TABLET: 875; 125 TABLET, FILM COATED ORAL at 10:02

## 2024-10-15 NOTE — NON STRESS TEST
"Triage Note - Nursing Documentation  Labor and Delivery Admission Log    Kaitlin Ko  : 2000  MRN: [id number]  CSN: [id number]    Date in / Time in:  10/15/2024  Time in: 657    Date out / Time out:    Time out: 1007    Nurse: Eva Howard RN    Patient Info: She is a 24 y.o. year old  at 39w6d with an WYATT of 10/16/2024, by Last Menstrual Period who was seen on the [facility].    Chief Complaint:   Chief Complaint   Patient presents with    Vaginal Bleeding     \"I woke up this morning to vaginal bleeding and pelvic pressure\"       /Provider Instructions / Disposition: Return to [facility] if symptoms worsen, water breaks, or it induction day 10/17/24.  Patient Active Problem List   Diagnosis    GBS (group B Streptococcus carrier), +RV culture, currently pregnant       NST Documentation (Only applicable > 32 weeks):     "

## 2024-10-15 NOTE — H&P
"  : 2000  MRN: [id number]  CSN: [id number]    History and Physical    Chief Complaint   Patient presents with    Vaginal Bleeding     \"I woke up this morning to vaginal bleeding and pelvic pressure\"      Kaitlin Ko is a 24 y.o. year old  with an Estimated Date of Delivery: 10/16/24 currently at 39w6d presenting with vaginal bleeding and and pelvic pressure .  Her symptoms started this morning. She has also felt cramping. Baby is active. She is scheduled for an induction on 10/17.    She has received prenatal care with [facility]. It has been complicated by GBS carrier      OB History    Para Term  AB Living   2 1 1 0 0 1   SAB IAB Ectopic Molar Multiple Live Births   0 0 0 0 0 1      # Outcome Date GA Lbr David/2nd Weight Sex Type Anes PTL Lv   2 Current            1 Term 19 39w6d  3317 g (7 lb 5 oz) M Vag-Spont   YOMI      Obstetric Comments   G1 born at [facility]     Past Medical History:   Diagnosis Date    Anxiety     Gestational diabetes     When I was pregnant    Trauma      Past Surgical History:   Procedure Laterality Date    ABDOMINOPLASTY N/A 2022    Procedure: ABDOMINOPLASTY WITH LIPOSUCTION;  Surgeon: Bismark Meyer MD;  Location:  [facility];  Service: Plastics;  Laterality: N/A;    APPENDECTOMY      SBO, emergent laparotomy    BACK SURGERY      lower back    BREAST AUGMENTATION Bilateral 2022    Procedure: BREAST AUGMENTATION AND IMPLANTS AND  LIPOSUCTION;  Surgeon: Bismark Meyer MD;  Location:  [facility];  Service: Plastics;  Laterality: Bilateral;    EXPLORATORY LAPAROTOMY      at age 6    MASTOPEXY Bilateral 2022    Procedure: MASTOPEXY;  Surgeon: Bismark Meyer MD;  Location:  [facility];  Service: Plastics;  Laterality: Bilateral;       Current Facility-Administered Medications:     amoxicillin-clavulanate (AUGMENTIN) 875-125 MG per tablet 1 tablet, 1 tablet, Oral, Q12H, Mikaela Galvan CNM    " lidocaine (XYLOCAINE) 1 % injection 0.5 mL, 0.5 mL, Intradermal, Once PRN, Foster, Mikaela A, CNM    sodium chloride 0.9 % flush 10 mL, 10 mL, Intravenous, Q12H, Foster, Mikaela A, CNM    sodium chloride 0.9 % flush 10 mL, 10 mL, Intravenous, PRN, Foster, Mikaela A, CNM    sodium chloride 0.9 % infusion 40 mL, 40 mL, Intravenous, PRN, Foster, Mikaela A, CNM    No Known Allergies    Review of Systems     Respiratory ROS: no cough, shortness of breath, or wheezing  Cardiovascular ROS: no chest pain or dyspnea on exertion  Gastrointestinal ROS: no abdominal pain, change in bowel habits, or black or bloody stools  Genito-Urinary ROS: no dysuria, trouble voiding, or hematuria        Objective   /68   Pulse 87   LMP 01/10/2024   SpO2 97%     Physical Exam: General Appearance: alert, appears stated age, and cooperative  Lungs: respirations regular, respirations even, and respirations unlabored  Abdomen: uterus gravid and nontender  Extremities: moves extremities well, no edema, no cyanosis, and no redness  Skin: no bleeding, bruising or rash and no lesions noted  Neurologic: Mental Status orientated to person, place, time and situation, Speech normal content and execusion       FHT's: reassuring, reactive, and category 1      Cervix: was checked (by me): 3 cm / 70 % / -3   Presentation: cephalic   Contractions: irregular - external monitors used         Prenatal Labs  Lab Results   Component Value Date    HGB 12.0 07/05/2024    HEPBSAG Negative 04/11/2024    ABSCRN Negative 04/11/2024    FVL7CRD2 Non Reactive 04/11/2024    HEPCVIRUSABY Non Reactive 04/11/2024    STREPGPB Positive (A) 09/23/2024       Current Labs Reviewed   Lab Results (last 24 hours)       Procedure Component Value Units Date/Time    Urinalysis, Microscopic Only - Urine, Clean Catch [[id number]]  (Abnormal) Collected: 10/15/24 0722    Specimen: Urine, Clean Catch Updated: 10/15/24 0734     RBC, UA None Seen /HPF      WBC, UA 6-10 /HPF       Bacteria, UA 2+ /HPF      Squamous Epithelial Cells, UA 0-2 /HPF      Hyaline Casts, UA None Seen /LPF      Methodology Manual Light Microscopy    Urine Culture - Urine, Urine, Clean Catch [[id number]] Collected: 10/15/24 0722    Specimen: Urine, Clean Catch Updated: 10/15/24 0734    POC Urinalysis Dipstick [[id number]]  (Abnormal) Collected: 10/15/24 0730    Specimen: Urine, Clean Catch Updated: 10/15/24 0731     Color Yellow     Clarity, UA Clear     Glucose, UA Negative mg/dL      Bilirubin Negative     Ketones, UA 3+     Specific Gravity  1.010     Blood, UA Large     pH, Urine 6.0     Protein, POC Trace mg/dL      Urobilinogen, UA Normal     Leukocytes Trace     Nitrite, UA Positive    Urinalysis With Culture If Indicated - Urine, Clean Catch [[id number]]  (Abnormal) Collected: 10/15/24 0722    Specimen: Urine, Clean Catch Updated: 10/15/24 0730     Color, UA Yellow     Appearance, UA Clear     pH, UA 6.0     Specific Gravity, UA 1.011     Glucose, UA Negative     Ketones, UA 80 mg/dL (3+)     Bilirubin, UA Negative     Blood, UA Small (1+)     Protein, UA Negative     Leuk Esterase, UA Small (1+)     Nitrite, UA Positive     Urobilinogen, UA 1.0 E.U./dL    Narrative:      In absence of clinical symptoms, the presence of pyuria, bacteria, and/or nitrites on the urinalysis result does not correlate with infection.                 Assessment   IUP at 39w6d  Vaginal bleeding  Contractions  UTI           Plan   Discussed admission for labor. She wants to go home. She has an autistic child at home.      Augmentin 875 po BID  Labor instructions    New Medications Ordered This Visit   Medications    sodium chloride 0.9 % flush 10 mL    sodium chloride 0.9 % flush 10 mL    sodium chloride 0.9 % infusion 40 mL    lidocaine (XYLOCAINE) 1 % injection 0.5 mL    nitrofurantoin, macrocrystal-monohydrate, (MACROBID) 100 MG capsule     Sig: Take 1 capsule by mouth 2 (Two) Times a Day for 7 days.     Dispense:  13 capsule      Refill:  0    amoxicillin-clavulanate (AUGMENTIN) 875-125 MG per tablet 1 tablet       Mikaela Galvan CNM  10/15/2024  09:22 EDT

## 2024-10-17 ENCOUNTER — HOSPITAL ENCOUNTER (INPATIENT)
Facility: HOSPITAL | Age: 24
LOS: 3 days | Discharge: HOME | End: 2024-10-20
Attending: MIDWIFE | Admitting: OBSTETRICS & GYNECOLOGY
Payer: COMMERCIAL

## 2024-10-17 ENCOUNTER — HOSPITAL ENCOUNTER (OUTPATIENT)
Dept: LABOR AND DELIVERY | Facility: HOSPITAL | Age: 24
Discharge: HOME | End: 2024-10-17
Payer: COMMERCIAL

## 2024-10-17 DIAGNOSIS — Z34.90 ENCOUNTER FOR INDUCTION OF LABOR: ICD-10-CM

## 2024-10-17 LAB
BACTERIA SPEC AEROBE CULT: ABNORMAL
BASOPHILS # BLD AUTO: 0.01 10*3/MM3 (ref 0–0.2)
BASOPHILS NFR BLD AUTO: 0.2 % (ref 0–1.5)
DEPRECATED RDW RBC AUTO: 45.5 FL (ref 37–54)
EOSINOPHIL # BLD AUTO: 0.02 10*3/MM3 (ref 0–0.4)
EOSINOPHIL NFR BLD AUTO: 0.4 % (ref 0.3–6.2)
ERYTHROCYTE [DISTWIDTH] IN BLOOD BY AUTOMATED COUNT: 14.4 % (ref 12.3–15.4)
HCT VFR BLD AUTO: 38.8 % (ref 34–46.6)
HGB BLD-MCNC: 12.8 G/DL (ref 12–15.9)
IMM GRANULOCYTES # BLD AUTO: 0.04 10*3/MM3 (ref 0–0.05)
IMM GRANULOCYTES NFR BLD AUTO: 0.7 % (ref 0–0.5)
LYMPHOCYTES # BLD AUTO: 1.57 10*3/MM3 (ref 0.7–3.1)
LYMPHOCYTES NFR BLD AUTO: 28.4 % (ref 19.6–45.3)
MCH RBC QN AUTO: 28.6 PG (ref 26.6–33)
MCHC RBC AUTO-ENTMCNC: 33 G/DL (ref 31.5–35.7)
MCV RBC AUTO: 86.8 FL (ref 79–97)
MONOCYTES # BLD AUTO: 0.54 10*3/MM3 (ref 0.1–0.9)
MONOCYTES NFR BLD AUTO: 9.8 % (ref 5–12)
NEUTROPHILS NFR BLD AUTO: 3.35 10*3/MM3 (ref 1.7–7)
NEUTROPHILS NFR BLD AUTO: 60.5 % (ref 42.7–76)
NRBC BLD AUTO-RTO: 0 /100 WBC (ref 0–0.2)
PLATELET # BLD AUTO: 170 10*3/MM3 (ref 140–450)
PMV BLD AUTO: 12.2 FL (ref 6–12)
RBC # BLD AUTO: 4.47 10*6/MM3 (ref 3.77–5.28)
WBC NRBC COR # BLD AUTO: 5.53 10*3/MM3 (ref 3.4–10.8)

## 2024-10-17 PROCEDURE — 86900 BLOOD TYPING SEROLOGIC ABO: CPT

## 2024-10-17 PROCEDURE — 25010000002 PENICILLIN G POTASSIUM PER 600000 UNITS: Performed by: MIDWIFE

## 2024-10-17 PROCEDURE — 86900 BLOOD TYPING SEROLOGIC ABO: CPT | Performed by: OBSTETRICS & GYNECOLOGY

## 2024-10-17 PROCEDURE — 85025 COMPLETE CBC W/AUTO DIFF WBC: CPT | Performed by: OBSTETRICS & GYNECOLOGY

## 2024-10-17 PROCEDURE — 86901 BLOOD TYPING SEROLOGIC RH(D): CPT

## 2024-10-17 PROCEDURE — 86901 BLOOD TYPING SEROLOGIC RH(D): CPT | Performed by: OBSTETRICS & GYNECOLOGY

## 2024-10-17 PROCEDURE — 59025 FETAL NON-STRESS TEST: CPT

## 2024-10-17 PROCEDURE — 86780 TREPONEMA PALLIDUM: CPT | Performed by: OBSTETRICS & GYNECOLOGY

## 2024-10-17 PROCEDURE — 86850 RBC ANTIBODY SCREEN: CPT | Performed by: OBSTETRICS & GYNECOLOGY

## 2024-10-17 PROCEDURE — 3E033VJ INTRODUCTION OF OTHER HORMONE INTO PERIPHERAL VEIN, PERCUTANEOUS APPROACH: ICD-10-PCS | Performed by: MIDWIFE

## 2024-10-17 RX ORDER — OXYTOCIN/0.9 % SODIUM CHLORIDE 30/500 ML
1-20 PLASTIC BAG, INJECTION (ML) INTRAVENOUS
Status: DISCONTINUED | OUTPATIENT
Start: 2024-10-17 | End: 2024-10-18 | Stop reason: HOSPADM

## 2024-10-17 RX ORDER — HYDROXYZINE HYDROCHLORIDE 25 MG/1
50 TABLET, FILM COATED ORAL NIGHTLY PRN
Status: DISCONTINUED | OUTPATIENT
Start: 2024-10-17 | End: 2024-10-18 | Stop reason: HOSPADM

## 2024-10-17 RX ORDER — OXYTOCIN/0.9 % SODIUM CHLORIDE 30/500 ML
250 PLASTIC BAG, INJECTION (ML) INTRAVENOUS CONTINUOUS
Status: DISPENSED | OUTPATIENT
Start: 2024-10-17 | End: 2024-10-18

## 2024-10-17 RX ORDER — METHYLERGONOVINE MALEATE 0.2 MG/ML
200 INJECTION INTRAVENOUS ONCE AS NEEDED
Status: COMPLETED | OUTPATIENT
Start: 2024-10-17 | End: 2024-10-18

## 2024-10-17 RX ORDER — ACETAMINOPHEN 325 MG/1
650 TABLET ORAL ONCE AS NEEDED
Status: DISCONTINUED | OUTPATIENT
Start: 2024-10-17 | End: 2024-10-18 | Stop reason: HOSPADM

## 2024-10-17 RX ORDER — PENICILLIN G 3000000 [IU]/50ML
3 INJECTION, SOLUTION INTRAVENOUS
Status: DISCONTINUED | OUTPATIENT
Start: 2024-10-18 | End: 2024-10-18

## 2024-10-17 RX ORDER — ONDANSETRON 2 MG/ML
4 INJECTION INTRAMUSCULAR; INTRAVENOUS ONCE AS NEEDED
Status: DISCONTINUED | OUTPATIENT
Start: 2024-10-17 | End: 2024-10-18 | Stop reason: HOSPADM

## 2024-10-17 RX ORDER — OXYTOCIN/0.9 % SODIUM CHLORIDE 30/500 ML
999 PLASTIC BAG, INJECTION (ML) INTRAVENOUS ONCE
Status: COMPLETED | OUTPATIENT
Start: 2024-10-17 | End: 2024-10-18

## 2024-10-17 RX ORDER — SODIUM CHLORIDE 0.9 % (FLUSH) 0.9 %
10 SYRINGE (ML) INJECTION EVERY 12 HOURS SCHEDULED
Status: DISCONTINUED | OUTPATIENT
Start: 2024-10-17 | End: 2024-10-18 | Stop reason: HOSPADM

## 2024-10-17 RX ORDER — ONDANSETRON 2 MG/ML
4 INJECTION INTRAMUSCULAR; INTRAVENOUS EVERY 6 HOURS PRN
Status: DISCONTINUED | OUTPATIENT
Start: 2024-10-17 | End: 2024-10-18 | Stop reason: HOSPADM

## 2024-10-17 RX ORDER — PROMETHAZINE HYDROCHLORIDE 12.5 MG/1
12.5 TABLET ORAL EVERY 6 HOURS PRN
Status: DISCONTINUED | OUTPATIENT
Start: 2024-10-17 | End: 2024-10-18 | Stop reason: HOSPADM

## 2024-10-17 RX ORDER — PENICILLIN G 3000000 [IU]/50ML
3 INJECTION, SOLUTION INTRAVENOUS
Status: COMPLETED | OUTPATIENT
Start: 2024-10-18 | End: 2024-10-18

## 2024-10-17 RX ORDER — MORPHINE SULFATE 2 MG/ML
2 INJECTION, SOLUTION INTRAMUSCULAR; INTRAVENOUS ONCE AS NEEDED
Status: DISCONTINUED | OUTPATIENT
Start: 2024-10-17 | End: 2024-10-18 | Stop reason: HOSPADM

## 2024-10-17 RX ORDER — CARBOPROST TROMETHAMINE 250 UG/ML
250 INJECTION, SOLUTION INTRAMUSCULAR ONCE AS NEEDED
Status: DISCONTINUED | OUTPATIENT
Start: 2024-10-17 | End: 2024-10-18 | Stop reason: HOSPADM

## 2024-10-17 RX ORDER — ONDANSETRON 4 MG/1
4 TABLET, ORALLY DISINTEGRATING ORAL ONCE AS NEEDED
Status: DISCONTINUED | OUTPATIENT
Start: 2024-10-17 | End: 2024-10-18 | Stop reason: HOSPADM

## 2024-10-17 RX ORDER — HYDROCODONE BITARTRATE AND ACETAMINOPHEN 5; 325 MG/1; MG/1
1 TABLET ORAL EVERY 4 HOURS PRN
Status: DISCONTINUED | OUTPATIENT
Start: 2024-10-17 | End: 2024-10-18 | Stop reason: HOSPADM

## 2024-10-17 RX ORDER — ACETAMINOPHEN 325 MG/1
650 TABLET ORAL EVERY 4 HOURS PRN
Status: DISCONTINUED | OUTPATIENT
Start: 2024-10-17 | End: 2024-10-18 | Stop reason: HOSPADM

## 2024-10-17 RX ORDER — SODIUM CHLORIDE 0.9 % (FLUSH) 0.9 %
10 SYRINGE (ML) INJECTION AS NEEDED
Status: DISCONTINUED | OUTPATIENT
Start: 2024-10-17 | End: 2024-10-18 | Stop reason: HOSPADM

## 2024-10-17 RX ORDER — MISOPROSTOL 200 UG/1
800 TABLET ORAL ONCE AS NEEDED
Status: DISCONTINUED | OUTPATIENT
Start: 2024-10-17 | End: 2024-10-18 | Stop reason: HOSPADM

## 2024-10-17 RX ORDER — ONDANSETRON 4 MG/1
4 TABLET, ORALLY DISINTEGRATING ORAL EVERY 6 HOURS PRN
Status: DISCONTINUED | OUTPATIENT
Start: 2024-10-17 | End: 2024-10-18 | Stop reason: HOSPADM

## 2024-10-17 RX ORDER — PROMETHAZINE HYDROCHLORIDE 12.5 MG/1
12.5 SUPPOSITORY RECTAL EVERY 6 HOURS PRN
Status: DISCONTINUED | OUTPATIENT
Start: 2024-10-17 | End: 2024-10-18 | Stop reason: HOSPADM

## 2024-10-17 RX ORDER — LIDOCAINE HYDROCHLORIDE 10 MG/ML
0.5 INJECTION, SOLUTION INFILTRATION; PERINEURAL ONCE AS NEEDED
Status: DISCONTINUED | OUTPATIENT
Start: 2024-10-17 | End: 2024-10-18 | Stop reason: HOSPADM

## 2024-10-17 RX ADMIN — PENICILLIN G 3 MILLION UNITS: 3000000 INJECTION, SOLUTION INTRAVENOUS at 23:11

## 2024-10-18 ENCOUNTER — ANESTHESIA EVENT (OUTPATIENT)
Dept: LABOR AND DELIVERY | Facility: HOSPITAL | Age: 24
End: 2024-10-18
Payer: COMMERCIAL

## 2024-10-18 ENCOUNTER — ANESTHESIA (OUTPATIENT)
Dept: LABOR AND DELIVERY | Facility: HOSPITAL | Age: 24
End: 2024-10-18
Payer: COMMERCIAL

## 2024-10-18 LAB
ABO GROUP BLD: NORMAL
ABO GROUP BLD: NORMAL
AMPHET+METHAMPHET UR QL: NEGATIVE
AMPHETAMINES UR QL: NEGATIVE
BARBITURATES UR QL SCN: NEGATIVE
BENZODIAZ UR QL SCN: NEGATIVE
BLD GP AB SCN SERPL QL: NEGATIVE
BUPRENORPHINE SERPL-MCNC: NEGATIVE NG/ML
CANNABINOIDS SERPL QL: POSITIVE
COCAINE UR QL: NEGATIVE
FENTANYL UR-MCNC: NEGATIVE NG/ML
METHADONE UR QL SCN: NEGATIVE
OPIATES UR QL: NEGATIVE
OXYCODONE UR QL SCN: NEGATIVE
PCP UR QL SCN: NEGATIVE
RH BLD: POSITIVE
RH BLD: POSITIVE
T&S EXPIRATION DATE: NORMAL
TREPONEMA PALLIDUM IGG+IGM AB [PRESENCE] IN SERUM OR PLASMA BY IMMUNOASSAY: NORMAL
TRICYCLICS UR QL SCN: NEGATIVE

## 2024-10-18 PROCEDURE — 10S0XZZ REPOSITION PRODUCTS OF CONCEPTION, EXTERNAL APPROACH: ICD-10-PCS | Performed by: STUDENT IN AN ORGANIZED HEALTH CARE EDUCATION/TRAINING PROGRAM

## 2024-10-18 PROCEDURE — 25010000002 METHYLERGONOVINE MALEATE PER 0.2 MG: Performed by: OBSTETRICS & GYNECOLOGY

## 2024-10-18 PROCEDURE — 59410 OBSTETRICAL CARE: CPT | Performed by: STUDENT IN AN ORGANIZED HEALTH CARE EDUCATION/TRAINING PROGRAM

## 2024-10-18 PROCEDURE — 51703 INSERT BLADDER CATH COMPLEX: CPT

## 2024-10-18 PROCEDURE — 0HQ9XZZ REPAIR PERINEUM SKIN, EXTERNAL APPROACH: ICD-10-PCS | Performed by: STUDENT IN AN ORGANIZED HEALTH CARE EDUCATION/TRAINING PROGRAM

## 2024-10-18 PROCEDURE — 80307 DRUG TEST PRSMV CHEM ANLYZR: CPT | Performed by: MIDWIFE

## 2024-10-18 PROCEDURE — C1755 CATHETER, INTRASPINAL: HCPCS | Performed by: NURSE ANESTHETIST, CERTIFIED REGISTERED

## 2024-10-18 PROCEDURE — 25010000002 ONDANSETRON PER 1 MG: Performed by: NURSE ANESTHETIST, CERTIFIED REGISTERED

## 2024-10-18 PROCEDURE — 25010000002 PENICILLIN G POTASSIUM PER 600000 UNITS: Performed by: MIDWIFE

## 2024-10-18 RX ORDER — POLYETHYLENE GLYCOL 3350 17 G/17G
17 POWDER, FOR SOLUTION ORAL DAILY PRN
Status: DISCONTINUED | OUTPATIENT
Start: 2024-10-18 | End: 2024-10-20 | Stop reason: HOSPADM

## 2024-10-18 RX ORDER — IBUPROFEN 800 MG/1
800 TABLET, FILM COATED ORAL EVERY 6 HOURS SCHEDULED
Status: DISCONTINUED | OUTPATIENT
Start: 2024-10-19 | End: 2024-10-20 | Stop reason: HOSPADM

## 2024-10-18 RX ORDER — CALCIUM CARBONATE 500 MG/1
2 TABLET, CHEWABLE ORAL 3 TIMES DAILY PRN
Status: DISCONTINUED | OUTPATIENT
Start: 2024-10-18 | End: 2024-10-20 | Stop reason: HOSPADM

## 2024-10-18 RX ORDER — HYDROCORTISONE 25 MG/G
1 CREAM TOPICAL AS NEEDED
Status: DISCONTINUED | OUTPATIENT
Start: 2024-10-18 | End: 2024-10-20 | Stop reason: HOSPADM

## 2024-10-18 RX ORDER — SODIUM CHLORIDE, SODIUM LACTATE, POTASSIUM CHLORIDE, CALCIUM CHLORIDE 600; 310; 30; 20 MG/100ML; MG/100ML; MG/100ML; MG/100ML
30 INJECTION, SOLUTION INTRAVENOUS CONTINUOUS
Status: DISCONTINUED | OUTPATIENT
Start: 2024-10-17 | End: 2024-10-18

## 2024-10-18 RX ORDER — DIPHENHYDRAMINE HCL 25 MG
25 CAPSULE ORAL NIGHTLY PRN
Status: DISCONTINUED | OUTPATIENT
Start: 2024-10-18 | End: 2024-10-20 | Stop reason: HOSPADM

## 2024-10-18 RX ORDER — DOCUSATE SODIUM 100 MG/1
100 CAPSULE, LIQUID FILLED ORAL 2 TIMES DAILY
Status: DISCONTINUED | OUTPATIENT
Start: 2024-10-18 | End: 2024-10-20 | Stop reason: HOSPADM

## 2024-10-18 RX ORDER — PROMETHAZINE HYDROCHLORIDE 12.5 MG/1
12.5 TABLET ORAL EVERY 4 HOURS PRN
Status: DISCONTINUED | OUTPATIENT
Start: 2024-10-18 | End: 2024-10-20 | Stop reason: HOSPADM

## 2024-10-18 RX ORDER — ACETAMINOPHEN 500 MG
1000 TABLET ORAL EVERY 6 HOURS
Status: DISCONTINUED | OUTPATIENT
Start: 2024-10-18 | End: 2024-10-20 | Stop reason: HOSPADM

## 2024-10-18 RX ORDER — EPHEDRINE SULFATE 5 MG/ML
5 INJECTION INTRAVENOUS
Status: DISCONTINUED | OUTPATIENT
Start: 2024-10-18 | End: 2024-10-18 | Stop reason: HOSPADM

## 2024-10-18 RX ORDER — FAMOTIDINE 10 MG/ML
20 INJECTION, SOLUTION INTRAVENOUS ONCE
Status: COMPLETED | OUTPATIENT
Start: 2024-10-18 | End: 2024-10-18

## 2024-10-18 RX ORDER — PRENATAL VIT/IRON FUM/FOLIC AC 27MG-0.8MG
1 TABLET ORAL DAILY
Status: DISCONTINUED | OUTPATIENT
Start: 2024-10-19 | End: 2024-10-20 | Stop reason: HOSPADM

## 2024-10-18 RX ORDER — ONDANSETRON 2 MG/ML
4 INJECTION INTRAMUSCULAR; INTRAVENOUS EVERY 6 HOURS PRN
Status: DISCONTINUED | OUTPATIENT
Start: 2024-10-18 | End: 2024-10-20 | Stop reason: HOSPADM

## 2024-10-18 RX ORDER — BISACODYL 10 MG
10 SUPPOSITORY, RECTAL RECTAL DAILY PRN
Status: DISCONTINUED | OUTPATIENT
Start: 2024-10-19 | End: 2024-10-20 | Stop reason: HOSPADM

## 2024-10-18 RX ORDER — SODIUM CHLORIDE 0.9 % (FLUSH) 0.9 %
1-10 SYRINGE (ML) INJECTION AS NEEDED
Status: DISCONTINUED | OUTPATIENT
Start: 2024-10-18 | End: 2024-10-20 | Stop reason: HOSPADM

## 2024-10-18 RX ORDER — ONDANSETRON 4 MG/1
4 TABLET, ORALLY DISINTEGRATING ORAL EVERY 6 HOURS PRN
Status: DISCONTINUED | OUTPATIENT
Start: 2024-10-18 | End: 2024-10-20 | Stop reason: HOSPADM

## 2024-10-18 RX ORDER — OXYTOCIN/0.9 % SODIUM CHLORIDE 30/500 ML
125 PLASTIC BAG, INJECTION (ML) INTRAVENOUS CONTINUOUS PRN
Status: DISCONTINUED | OUTPATIENT
Start: 2024-10-18 | End: 2024-10-20 | Stop reason: HOSPADM

## 2024-10-18 RX ORDER — ONDANSETRON 2 MG/ML
4 INJECTION INTRAMUSCULAR; INTRAVENOUS ONCE AS NEEDED
Status: COMPLETED | OUTPATIENT
Start: 2024-10-18 | End: 2024-10-18

## 2024-10-18 RX ORDER — CITRIC ACID/SODIUM CITRATE 334-500MG
30 SOLUTION, ORAL ORAL ONCE
Status: DISCONTINUED | OUTPATIENT
Start: 2024-10-18 | End: 2024-10-18 | Stop reason: HOSPADM

## 2024-10-18 RX ADMIN — DOCUSATE SODIUM 100 MG: 100 CAPSULE, LIQUID FILLED ORAL at 22:09

## 2024-10-18 RX ADMIN — PENICILLIN G 3 MILLION UNITS: 3000000 INJECTION, SOLUTION INTRAVENOUS at 00:17

## 2024-10-18 RX ADMIN — BENZOCAINE AND LEVOMENTHOL: 200; 5 SPRAY TOPICAL at 22:10

## 2024-10-18 RX ADMIN — PENICILLIN G 3 MILLION UNITS: 3000000 INJECTION, SOLUTION INTRAVENOUS at 03:18

## 2024-10-18 RX ADMIN — PENICILLIN G 3 MILLION UNITS: 3000000 INJECTION, SOLUTION INTRAVENOUS at 08:07

## 2024-10-18 RX ADMIN — ONDANSETRON 4 MG: 2 INJECTION INTRAMUSCULAR; INTRAVENOUS at 20:04

## 2024-10-18 RX ADMIN — Medication 12 ML/HR: at 10:55

## 2024-10-18 RX ADMIN — Medication 1 MILLI-UNITS/MIN: at 03:19

## 2024-10-18 RX ADMIN — AMOXICILLIN AND CLAVULANATE POTASSIUM 1 TABLET: 875; 125 TABLET, FILM COATED ORAL at 22:09

## 2024-10-18 RX ADMIN — ACETAMINOPHEN 1000 MG: 500 TABLET, FILM COATED ORAL at 22:10

## 2024-10-18 RX ADMIN — PENICILLIN G 3 MILLION UNITS: 3000000 INJECTION, SOLUTION INTRAVENOUS at 16:22

## 2024-10-18 RX ADMIN — FAMOTIDINE 20 MG: 10 INJECTION INTRAVENOUS at 16:22

## 2024-10-18 RX ADMIN — Medication 10 ML/HR: at 17:19

## 2024-10-18 RX ADMIN — PENICILLIN G 3 MILLION UNITS: 3000000 INJECTION, SOLUTION INTRAVENOUS at 12:15

## 2024-10-18 RX ADMIN — Medication 999 ML/HR: at 19:30

## 2024-10-18 RX ADMIN — METHYLERGONOVINE MALEATE 200 MCG: 0.2 INJECTION, SOLUTION INTRAMUSCULAR; INTRAVENOUS at 19:08

## 2024-10-18 NOTE — NURSING NOTE
2030) Sharmaine ANTHONY at bedside, discussed POC with pt. Verbal order given to start GBS protocol then once antibiotics have been in for four hours, then start low-dose Pitocin.

## 2024-10-18 NOTE — PLAN OF CARE
Goal Outcome Evaluation:  Plan of Care Reviewed With: patient, significant other        Progress: improving     Pt delivered viable male infant at 1825 via , with shoulder dystocia and postpartum hemorrhage post delivery. Pt received methergine and additional pitocin. Fundus is firm 1 cm below U. Pt is breastfeeding well.

## 2024-10-18 NOTE — ANESTHESIA PREPROCEDURE EVALUATION
Anesthesia Evaluation     Patient summary reviewed and Nursing notes reviewed   NPO Solid Status: > 8 hours  NPO Liquid Status: < 2 hours           Airway   Mallampati: II  TM distance: >3 FB  Neck ROM: full  Possible difficult intubation  Dental - normal exam     Pulmonary - normal exam   (+) a smoker Former,  Cardiovascular - negative cardio ROS and normal exam        Neuro/Psych  (+) psychiatric history Anxiety  GI/Hepatic/Renal/Endo    (+) diabetes mellitus    Musculoskeletal (-) negative ROS    Abdominal    Substance History - negative use     OB/GYN    (+) Pregnant        Other - negative ROS       ROS/Med Hx Other:               Anesthesia Plan    ASA 2     epidural     (Risk and benefits discussed, discussed risk of nausea, vomiting, itching, low blood pressure, post-procedural back pain, PDPH, and infection. Patient reports understanding. Continue with POC)  intravenous induction     Anesthetic plan, risks, benefits, and alternatives have been provided, discussed and informed consent has been obtained with: patient.  Pre-procedure education provided  Plan discussed with CRNA.    CODE STATUS:    Code Status (Patient has no pulse and is not breathing): CPR (Attempt to Resuscitate)  Medical Interventions (Patient has pulse or is breathing): Full

## 2024-10-18 NOTE — H&P
" Tavares Ko  : 2000  MRN: [id number]  CSN: [id number]    History and Physical    Chief Complaint   Patient presents with    Scheduled Induction     \"I'm here to be induced\"        Subjective   Kaitlin Ko is a 24 y.o. year old  with an Estimated Date of Delivery: 10/16/24 currently 40w2d presenting for induction of labor for elective at term per patient request. She arrived last pm for low dose Pitocin. Her ctxs aren't painful.    Risks of labor induction including prolongation of labor, increased risks for both  section and operative vaginal birth have been discussed at length.     Prenatal care has been with [facility].  It has been complicated by late prenatal care and GBS carrier. First PNV on 2024 @ 13 weeks with 12 visits. Weight gain = 40  lbs.     OB History    Para Term  AB Living   2 1 1 0 0 1   SAB IAB Ectopic Molar Multiple Live Births   0 0 0 0 0 1      # Outcome Date GA Lbr David/2nd Weight Sex Type Anes PTL Lv   2 Current            1 Term 19 39w6d  3317 g (7 lb 5 oz) M Vag-Spont   YOMI      Obstetric Comments   G1 born at [facility]     Past Medical History:   Diagnosis Date    Anxiety     Gestational diabetes     When I was pregnant    Trauma      Past Surgical History:   Procedure Laterality Date    ABDOMINOPLASTY N/A 2022    Procedure: ABDOMINOPLASTY WITH LIPOSUCTION;  Surgeon: Bismark Meyer MD;  Location:  [facility];  Service: Plastics;  Laterality: N/A;    APPENDECTOMY      SBO, emergent laparotomy    BACK SURGERY      lower back    BREAST AUGMENTATION Bilateral 2022    Procedure: BREAST AUGMENTATION AND IMPLANTS AND  LIPOSUCTION;  Surgeon: Bismark Meyer MD;  Location:  [facility];  Service: Plastics;  Laterality: Bilateral;    EXPLORATORY LAPAROTOMY      at age 6    MASTOPEXY Bilateral 2022    Procedure: MASTOPEXY;  Surgeon: Bismark Meyer MD;  Location:  [facility]; "  Service: Plastics;  Laterality: Bilateral;       Current Facility-Administered Medications:     acetaminophen (TYLENOL) tablet 650 mg, 650 mg, Oral, Q4H PRN, Justice Lomas MD    acetaminophen (TYLENOL) tablet 650 mg, 650 mg, Oral, Once PRN, Justice Lomas MD    carboprost (HEMABATE) injection 250 mcg, 250 mcg, Intramuscular, Once PRN, Justice Lomas MD    ePHEDrine Sulfate (Pressors) 5 MG/ML injection 5 mg, 5 mg, Intravenous, Q10 Min PRN, Tylor Mcgowan CRNA    fentaNYL 2mcg/mL and ropivacaine 0.2% in NS epidural 100 mL, 10 mL/hr, Epidural, Continuous, Tylor Mcgowan CRNA    HYDROcodone-acetaminophen (NORCO) 5-325 MG per tablet 1 tablet, 1 tablet, Oral, Q4H PRN, Justice Lomas MD    hydrOXYzine (ATARAX) tablet 50 mg, 50 mg, Oral, Nightly PRN, Justice Lomas MD    lactated ringers bolus 1,000 mL, 1,000 mL, Intravenous, Once, Justice Lomas MD    lactated ringers bolus 1,000 mL, 1,000 mL, Intravenous, Once, Tylor Mcgowan CRNA    lactated ringers infusion, 30 mL/hr, Intravenous, Continuous, Mikaela Galvan CNM    lidocaine (XYLOCAINE) 1 % injection 0.5 mL, 0.5 mL, Intradermal, Once PRN, Justice Lomas MD    methylergonovine (METHERGINE) injection 200 mcg, 200 mcg, Intramuscular, Once PRN, Justice Lomas MD    miSOPROStol (CYTOTEC) tablet 800 mcg, 800 mcg, Rectal, Once PRN, Justice Lomas MD    morphine injection 4 mg, 4 mg, Intravenous, Q4H PRN, Justice Lomas MD    morphine injection 4 mg, 4 mg, Intravenous, Once PRN, Justice Lomas MD    morphine injection 6 mg, 6 mg, Intravenous, Q4H PRN, Justice Lomas MD    Morphine sulfate (PF) injection 2 mg, 2 mg, Intravenous, Once PRN, Justice Lomas MD    ondansetron ODT (ZOFRAN-ODT) disintegrating tablet 4 mg, 4 mg, Oral, Q6H PRN **OR** ondansetron (ZOFRAN) injection 4 mg, 4 mg, Intravenous, Q6H PRN, Justice Lomas MD    ondansetron ODT (ZOFRAN-ODT) disintegrating tablet 4  mg, 4 mg, Oral, Once PRN **OR** ondansetron (ZOFRAN) injection 4 mg, 4 mg, Intravenous, Once PRN, Justice Lomas MD    ondansetron (ZOFRAN) injection 4 mg, 4 mg, Intravenous, Once PRN, Tylor Mcgowan CRNA    oxytocin (PITOCIN) 30 units in 0.9% sodium chloride 500 mL (premix), 1-20 michele-units/min, Intravenous, Titrated, Mikaela Galvan CNM, Last Rate: 10 mL/hr at 10/18/24 0654, 10 michele-units/min at 10/18/24 0654    oxytocin (PITOCIN) 30 units in 0.9% sodium chloride 500 mL (premix), 999 mL/hr, Intravenous, Once **FOLLOWED BY** [] oxytocin (PITOCIN) 30 units in 0.9% sodium chloride 500 mL (premix), 250 mL/hr, Intravenous, Continuous, Justice Lomas MD    [COMPLETED] penicillin G in iso-osmotic dextrose IVPB 3 million units (premix), 3 Million Units, Intravenous, Q30 Min, Last Rate: 100 mL/hr at 10/18/24 0017, 3 Million Units at 10/18/24 0017 **FOLLOWED BY** penicillin G in iso-osmotic dextrose IVPB 3 million units (premix), 3 Million Units, Intravenous, Q4H, Mikaela Galvan CNM, Last Rate: 100 mL/hr at 10/18/24 0318, 3 Million Units at 10/18/24 0318    promethazine (PHENERGAN) suppository 12.5 mg, 12.5 mg, Rectal, Q6H PRN **OR** promethazine (PHENERGAN) tablet 12.5 mg, 12.5 mg, Oral, Q6H PRN, Justice Lomas MD    promethazine (PHENERGAN) tablet 12.5 mg, 12.5 mg, Oral, Q6H PRN, Justice Lomas MD    Sod Citrate-Citric Acid (BICITRA) oral solution 30 mL, 30 mL, Oral, Once, Tylor Mcgowan CRNA    sodium chloride 0.9 % flush 10 mL, 10 mL, Intravenous, Q12H, Justice Lomas MD    sodium chloride 0.9 % flush 10 mL, 10 mL, Intravenous, PRN, Justice Lomas MD    No Known Allergies  Social History    Tobacco Use      Smoking status: Former        Packs/day: 0.00        Years: 1 pack/day for 5.0 years (5.0 ttl pk-yrs)        Types: Cigars, Cigarettes        Start date: 12/10/2018        Quit date: 12/10/2021        Years since quittin.8        Passive exposure: Yes     "  Smokeless tobacco: Never    Review of Systems   Constitutional: Negative.    Respiratory: Negative.     Cardiovascular: Negative.    Gastrointestinal: Negative.    Genitourinary: Negative.    Musculoskeletal: Negative.    Neurological: Negative.    Psychiatric/Behavioral: Negative.     All other systems reviewed and are negative.        Objective   /74   Pulse 91   Temp 97.9 °F (36.6 °C) (Oral)   Resp 16   Ht 167.6 cm (66\")   Wt 109 kg (240 lb 12.8 oz)   LMP 01/10/2024   SpO2 100%   Breastfeeding Yes   BMI 38.87 kg/m²                                           General:  well developed; well nourished  no acute distress  mentation appropriate   Neck:    Heart:   supple and no masses  normal apical impulse  regular rate and rhythm   Lungs: breathing is unlabored   Abdomen: Gravid and non-tender  EFW: 8#, growth scan @ 36 wks=71%, AC 90%, posterior placenta   FHT's: reassuring, reactive, and category 1   Cervix: was checked (by me): 2-3 cm / 70 % / -3 very posterior and to the right, attempted AROM but unsuccessful   Presentation: cephalic   Contractions: every 3-4 minutes - external monitors used Pitocin @ 12 mu   Extremities:   normal appearance with no cyanosis or edema and no calf tenderness   Skin:  Skin color, texture, turgor normal. No rashes or lesions or Skin warm and dry   Psych:  Normal. and Alert and oriented, appropriate affect.       Prenatal Labs  Lab Results   Component Value Date    HGB 12.8 10/17/2024    HEPBSAG Negative 04/11/2024    ABSCRN Negative 10/17/2024    WMA1NSE7 Non Reactive 04/11/2024    HEPCVIRUSABY Non Reactive 04/11/2024    STREPGPB Positive (A) 09/23/2024    URINECX >100,000 CFU/mL Escherichia coli (A) 10/15/2024       Current Labs Reviewed   Lab Results (last 24 hours)       Procedure Component Value Units Date/Time    CBC & Differential [[id number]]  (Abnormal) Collected: 10/17/24 2302    Specimen: Blood Updated: 10/17/24 2313    Narrative:      The following orders " were created for panel order CBC & Differential.  Procedure                               Abnormality         Status                     ---------                               -----------         ------                     CBC Auto Differential[[id number]]        Abnormal            Final result                 Please view results for these tests on the individual orders.    CBC Auto Differential [[id number]]  (Abnormal) Collected: 10/17/24 2302    Specimen: Blood Updated: 10/17/24 2313     WBC 5.53 10*3/mm3      RBC 4.47 10*6/mm3      Hemoglobin 12.8 g/dL      Hematocrit 38.8 %      MCV 86.8 fL      MCH 28.6 pg      MCHC 33.0 g/dL      RDW 14.4 %      RDW-SD 45.5 fl      MPV 12.2 fL      Platelets 170 10*3/mm3      Neutrophil % 60.5 %      Lymphocyte % 28.4 %      Monocyte % 9.8 %      Eosinophil % 0.4 %      Basophil % 0.2 %      Immature Grans % 0.7 %      Neutrophils, Absolute 3.35 10*3/mm3      Lymphocytes, Absolute 1.57 10*3/mm3      Monocytes, Absolute 0.54 10*3/mm3      Eosinophils, Absolute 0.02 10*3/mm3      Basophils, Absolute 0.01 10*3/mm3      Immature Grans, Absolute 0.04 10*3/mm3      nRBC 0.0 /100 WBC     Treponema pallidum AB w/Reflex RPR [[id number]] Collected: 10/17/24 2302    Specimen: Blood Updated: 10/17/24 2309               ASSESSMENT  IUP at 40w2d  Induction of labor because of elective at term per patient request  Group B strep status: positive  Reactive NST    PLAN  Admit to   Low dose Pitocin induction and increase per protocol @ 0500  All procedures explained to patient and partner. Questions answered and verbalized understanding.  Epidural when needed  Anticipate     Mikaela Galvan, NABOR  10/18/2024  07:36 EDT

## 2024-10-18 NOTE — NON STRESS TEST
"  Kaitlin Ko, a  at 40w1d with an WYATT of 10/16/2024, by Last Menstrual Period, was seen at [facility] for a nonstress test.    Chief Complaint   Patient presents with    Scheduled Induction     \"I'm here to be induced\"         Patient Active Problem List   Diagnosis    GBS (group B Streptococcus carrier), +RV culture, currently pregnant    Encounter for induction of labor       Start Time:   Stop Time:     Interpretation A  Nonstress Test Interpretation A: Reactive                 "

## 2024-10-18 NOTE — ANESTHESIA PROCEDURE NOTES
Labor Epidural      Patient location during procedure: OB  Start Time: 10/18/2024 10:26 AM  Performed By  CRNA/CAA: Marshall Alfredo CRNA  Preanesthetic Checklist  Completed: patient identified, IV checked, site marked, risks and benefits discussed, surgical consent, monitors and equipment checked, pre-op evaluation and timeout performed  Additional Notes  Risks discussed , including but not limited to:  Headache, itching, n&v, infection, failure, decreased blood pressure, permanent chronic/back pain, nerve damage, paralysis, etc. All questions answered and informed consent obtained. Skin localized with lidocaine skin wheel. Test dose _ 3ml of 1.5% lidocaine with 1:200,000 epi.  Discussed with pt extra risk with hardware in the back   pt accepts risk  Prep:  Pt Position:sitting  Sterile Tech:cap, gloves, gown, mask and sterile barrier  Prep:chlorhexidine gluconate and isopropyl alcohol  Monitoring:blood pressure monitoring and continuous pulse oximetry  Epidural Block Procedure:  Approach:midline  Guidance:landmark technique and palpation technique  Location:L3-L4  Needle Type:Tuohy  Needle Gauge:18 G  Loss of Resistance: 5cm  Cath Depth at skin:7 cm  Paresthesia: none  Aspiration:negative  Test Dose:negative  Number of Attempts: 1  Post Assessment:  Dressing:occlusive dressing applied and secured with tape  Pt Tolerance:patient tolerated the procedure well with no apparent complications  Complications:no

## 2024-10-18 NOTE — PROGRESS NOTES
Tavares Ko  : 2000  MRN: [id number]  CSN: [id number]    Labor progress note    Subjective   She reports comfortable with epidural     Objective   Min/max vitals past 24 hours:  Temp  Min: 97.9 °F (36.6 °C)  Max: 98.6 °F (37 °C)   BP  Min: 89/60  Max: 125/77   Pulse  Min: 60  Max: 118   Resp  Min: 16  Max: 16        FHT's: reassuring, reactive, and category 1   Cervix: was checked (by me): 5 cm / 90 % / -2 midline midposition   Contractions: every 2-3 minutes - external monitors used Pitocin @ 30 for about 1.5 hours      Assessment   IUP at 40w2d  Fetal status reassuring     Plan   Continue with induction  Will stop Pitocin x 1 hour and then restart @ 10 mu.    Mikaela Galvan, APRN  10/18/2024  14:38 EDT

## 2024-10-18 NOTE — L&D DELIVERY NOTE
[facility]  Vaginal Delivery Note    Delivery Details   Delivery: Vaginal, Spontaneous    YOB: 2023   Time of birth: 6:25 PM   Anesthesia Epidural    Delivering clinician: Mehnaz Ho   Forceps: No   Vacuum: No   Shoulder dystocia: Yes     Shoulder Dystocia Details  Dystocia Present? Yes  Time head delivered: 10/18/2024  6:24 PM  Gentle attempt at traction, assisted by maternal expulsive forces: Yes  Anterior shoulder: Left shoulder  Time recognized: 10/18/2024  6:24 PM  Time help called: 10/18/2024  6:24 PM Called by: Yael Shafer RN   Time additional staff arrived: 10/18/2024  6:25 PM Additional staff:     Maneuvers used: Dajuan, suprapubic pressure, posterior arm delivery     Delivery narrative:  The patient progressed in labor to 10/100/+1 and began pushing. After delivery of the fetal head, the fetal body did not deliver with standard, gentle downward traction on the fetal head. The left shoulder was noted to be anterior and behind the pubic bone. A shoulder dystocia was identified and the team was notified. The patient was placed in Dajuan' position and suprapubic pressure was applied by nursing staff. The posterior arm was then successfully delivered, and a viable male infant was able to be delivered. Total time from head delivery to infant delivery was approximately 60 seconds. The infant was placed on the mother's abdomen and the mouth and nose were bulb suctioned. Cord clamping was delayed 30 seconds. The placenta was delivered in standard fashion and was found to be intact. Uterine tone and bleeding were adequate with massage and Pitocin. Lacerations were inspected and repaired as detailed below.    Infant Details   Findings: Male infant   Infant observations: Weight: 4547 g (10 lb 0.4 oz)  Length: 20.5 in   Apgars: 7  @ 1 minute /    9  @ 5 minutes     Placenta, Cord, and Fluid   Placenta delivered:  Spontaneous at   10/18/2024  6:31 PM    Cord: 3 vessels present.   Nuchal  cord: No   Cord blood obtained: Yes     Repair    Episiotomy: None   Lacerations: Yes  Laceration Information  Laceration Repaired?   Perineal: 1st  Repaired with 3-0 Vicryl in a running fashion.   Periurethral:       Labial: right Yes repaired with 3-0 Vicryl in an interrupted and figure-of-eight fashion   Sulcus:       Vaginal:       Cervical:         Suture used for repair: 3-0 Vicryl   Estimated blood loss: 1,000 cc     Complications  Shoulder dystocia    Disposition  Mother to Mother Baby/Postpartum in stable condition currently.  Baby remains with mom in stable condition currently.    Mehnaz Ho MD  10/18/24  19:09 EDT

## 2024-10-19 LAB
BASOPHILS # BLD AUTO: 0.03 10*3/MM3 (ref 0–0.2)
BASOPHILS NFR BLD AUTO: 0.3 % (ref 0–1.5)
DEPRECATED RDW RBC AUTO: 46.4 FL (ref 37–54)
EOSINOPHIL # BLD AUTO: 0.03 10*3/MM3 (ref 0–0.4)
EOSINOPHIL NFR BLD AUTO: 0.3 % (ref 0.3–6.2)
ERYTHROCYTE [DISTWIDTH] IN BLOOD BY AUTOMATED COUNT: 14.4 % (ref 12.3–15.4)
HCT VFR BLD AUTO: 39.6 % (ref 34–46.6)
HGB BLD-MCNC: 13.1 G/DL (ref 12–15.9)
IMM GRANULOCYTES # BLD AUTO: 0.03 10*3/MM3 (ref 0–0.05)
IMM GRANULOCYTES NFR BLD AUTO: 0.3 % (ref 0–0.5)
LYMPHOCYTES # BLD AUTO: 2.24 10*3/MM3 (ref 0.7–3.1)
LYMPHOCYTES NFR BLD AUTO: 21.5 % (ref 19.6–45.3)
MCH RBC QN AUTO: 29 PG (ref 26.6–33)
MCHC RBC AUTO-ENTMCNC: 33.1 G/DL (ref 31.5–35.7)
MCV RBC AUTO: 87.8 FL (ref 79–97)
MONOCYTES # BLD AUTO: 0.97 10*3/MM3 (ref 0.1–0.9)
MONOCYTES NFR BLD AUTO: 9.3 % (ref 5–12)
NEUTROPHILS NFR BLD AUTO: 68.3 % (ref 42.7–76)
NEUTROPHILS NFR BLD AUTO: 7.14 10*3/MM3 (ref 1.7–7)
NRBC BLD AUTO-RTO: 0 /100 WBC (ref 0–0.2)
PLATELET # BLD AUTO: 153 10*3/MM3 (ref 140–450)
PMV BLD AUTO: 11.8 FL (ref 6–12)
RBC # BLD AUTO: 4.51 10*6/MM3 (ref 3.77–5.28)
WBC NRBC COR # BLD AUTO: 10.44 10*3/MM3 (ref 3.4–10.8)

## 2024-10-19 PROCEDURE — 0503F POSTPARTUM CARE VISIT: CPT | Performed by: STUDENT IN AN ORGANIZED HEALTH CARE EDUCATION/TRAINING PROGRAM

## 2024-10-19 PROCEDURE — 85025 COMPLETE CBC W/AUTO DIFF WBC: CPT | Performed by: STUDENT IN AN ORGANIZED HEALTH CARE EDUCATION/TRAINING PROGRAM

## 2024-10-19 RX ORDER — OXYCODONE HYDROCHLORIDE 5 MG/1
5 TABLET ORAL EVERY 4 HOURS PRN
Status: DISCONTINUED | OUTPATIENT
Start: 2024-10-19 | End: 2024-10-20 | Stop reason: HOSPADM

## 2024-10-19 RX ADMIN — IBUPROFEN 800 MG: 800 TABLET ORAL at 13:04

## 2024-10-19 RX ADMIN — ACETAMINOPHEN 1000 MG: 500 TABLET, FILM COATED ORAL at 15:17

## 2024-10-19 RX ADMIN — OXYCODONE 5 MG: 5 TABLET ORAL at 02:06

## 2024-10-19 RX ADMIN — DOCUSATE SODIUM 100 MG: 100 CAPSULE, LIQUID FILLED ORAL at 21:12

## 2024-10-19 RX ADMIN — ACETAMINOPHEN 1000 MG: 500 TABLET, FILM COATED ORAL at 22:19

## 2024-10-19 RX ADMIN — AMOXICILLIN AND CLAVULANATE POTASSIUM 1 TABLET: 875; 125 TABLET, FILM COATED ORAL at 08:40

## 2024-10-19 RX ADMIN — IBUPROFEN 800 MG: 800 TABLET ORAL at 01:03

## 2024-10-19 RX ADMIN — PRENATAL VITAMINS-IRON FUMARATE 27 MG IRON-FOLIC ACID 0.8 MG TABLET 1 TABLET: at 08:40

## 2024-10-19 RX ADMIN — ACETAMINOPHEN 1000 MG: 500 TABLET, FILM COATED ORAL at 09:06

## 2024-10-19 RX ADMIN — DOCUSATE SODIUM 100 MG: 100 CAPSULE, LIQUID FILLED ORAL at 08:40

## 2024-10-19 RX ADMIN — ACETAMINOPHEN 1000 MG: 500 TABLET, FILM COATED ORAL at 03:57

## 2024-10-19 RX ADMIN — OXYCODONE 5 MG: 5 TABLET ORAL at 12:28

## 2024-10-19 RX ADMIN — IBUPROFEN 800 MG: 800 TABLET ORAL at 18:52

## 2024-10-19 RX ADMIN — AMOXICILLIN AND CLAVULANATE POTASSIUM 1 TABLET: 875; 125 TABLET, FILM COATED ORAL at 21:12

## 2024-10-19 RX ADMIN — IBUPROFEN 800 MG: 800 TABLET ORAL at 06:43

## 2024-10-19 NOTE — PLAN OF CARE
Goal Outcome Evaluation:  Plan of Care Reviewed With: patient           Outcome Evaluation: VSS, bonding well with infant. C/O increased pain throughout shift. PRN pain meds given. Breastfeeding but requested formula for infant since transitioning to postpartum. Pt educated on the importance of breastfeeding and continuing to latch infant to breast as well as skin to skin contact with infant. Pt verbally understands but expresses that she still wants to formula feed for now. Hand pump given for hand expressing colostrum as pt states that she still wants to breastfeed. Bernarda syed.

## 2024-10-19 NOTE — PROGRESS NOTES
Tavares Ko  : 2000  MRN: [id number]  CSN: [id number]    Postpartum Day #1  Subjective   Her pain is moderately controlled - she reports some ongoing cramping. Vaginal bleeding is appropriate. She is tolerating oral intake. She is ambulatory. She is voiding spontaneously. She is breast and bottle feeding without concern.      Objective     Min/max vitals past 24 hours:   Temp  Min: 98 °F (36.7 °C)  Max: 98.3 °F (36.8 °C)  BP  Min: 89/60  Max: 144/84  Pulse  Min: 62  Max: 120  Resp  Min: 16  Max: 18        General: Well developed, well nourished and in no acute distress   Abdomen: Soft, non-tender, no masses and fundus firm and non-tender   Pelvic: Not performed   Ext: Non-tender, not edematous     Lab Results   Component Value Date    WBC 10.44 10/19/2024    HGB 13.1 10/19/2024    HCT 39.6 10/19/2024    MCV 87.8 10/19/2024     10/19/2024    RUBELLAABIGG 1.02 2024    HEPBSAG Negative 2024        Assessment & Plan    Postpartum Day #1 S/P spontaneous vaginal delivery complicated by a shoulder dystocia and PPH.  Continue routine postpartum care  Encourage ambulation  Breastfeeding support PRN  PPH - H/H stable, no concerns  Plans BTL for contraception - discussed she will need to sign a Federal Form 30 days prior to her procedure. Declines alternative contraception in interim.    Anticipate d/c PPD#2 (late delivery)    Mehnaz Ho MD  10/19/2024  11:20 EDT

## 2024-10-19 NOTE — PLAN OF CARE
Goal Outcome Evaluation:              Outcome Evaluation: VSS, pt reports pain is controlled with prescribed pain medication, pt currently formula feeding infant and using a manual pump to stimulate breast, pt expresses she is very sore around groin/hips.

## 2024-10-19 NOTE — PAYOR COMM NOTE
"DELIVERY NOTICE    To:  [facility]  From: Meet Daniels RN  Phone: [phone]  Fax: [phone]  NPI: [id number]  TIN: [id number]     _P: 2   EDC 10/16/24 @ 40w2d  DELIVERED: 10/18/24@ 1825.  Vaginal Male  10#0.4OZ.; 4547GMS; APGAR 7/9    Kaitlin John (24 y.o. Female)       Date of Birth   2000    Social Security Number       Address   [address] [place] [id number]    Home Phone   [phone]    MRN   [id number]       Islam   None    Marital Status   Significant Other                            Admission Date   10/17/24    Admission Type   Elective    Admitting Provider   Justice Lomas MD    Attending Provider   Mikaela Galvan CNM    Department, Room/Bed   [facility]YN, W204/       Discharge Date       Discharge Disposition       Discharge Destination                                 Attending Provider: Mikaela Galvan CNM    Allergies: No Known Allergies    Isolation: None   Infection: None   Code Status: CPR    Ht: 167.6 cm (66\")   Wt: 109 kg (240 lb 12.8 oz)    Admission Cmt: None   Principal Problem: Encounter for induction of labor [Z34.90]                   Active Insurance as of 10/17/2024       Primary Coverage       Payor Plan Insurance Group Employer/Plan Group    [employer] [place] [employer] [place]        Payor Plan Address Payor Plan Phone Number Payor Plan Fax Number Effective Dates    [address] [address] [phone]  3/11/2024 - None Entered    [place] [address]         Subscriber Name Subscriber Birth Date Member ID       KAITLIN JOHN 2000 [id number]                     Emergency Contacts        (Rel.) Home Phone Work Phone Mobile Phone    TURNER MORALES (Significant Other) -- -- [phone]                 History & Physical        Mikalea Galvan CNM at 10/18/24 0736       Attestation signed by Mehnaz Ho MD at 10/18/24 0901    I have reviewed this documentation and agree.    \Mehnaz Ho, " "MD   Obstetrics and Gynecology  [facility] Tavares Ko  : 2000  MRN: [id number]  CSN: [id number]    History and Physical    Chief Complaint   Patient presents with    Scheduled Induction     \"I'm here to be induced\"        Subjective  Kaitlin Ko is a 24 y.o. year old  with an Estimated Date of Delivery: 10/16/24 currently 40w2d presenting for induction of labor for elective at term per patient request. She arrived last pm for low dose Pitocin. Her ctxs aren't painful.    Risks of labor induction including prolongation of labor, increased risks for both  section and operative vaginal birth have been discussed at length.     Prenatal care has been with [facility].  It has been complicated by late prenatal care and GBS carrier. First PNV on 2024 @ 13 weeks with 12 visits. Weight gain = 40  lbs.     OB History    Para Term  AB Living   2 1 1 0 0 1   SAB IAB Ectopic Molar Multiple Live Births   0 0 0 0 0 1      # Outcome Date GA Lbr David/2nd Weight Sex Type Anes PTL Lv   2 Current            1 Term 19 39w6d  3317 g (7 lb 5 oz) M Vag-Spont   YOMI      Obstetric Comments   G1 born at [facility]     Past Medical History:   Diagnosis Date    Anxiety     Gestational diabetes     When I was pregnant    Trauma      Past Surgical History:   Procedure Laterality Date    ABDOMINOPLASTY N/A 2022    Procedure: ABDOMINOPLASTY WITH LIPOSUCTION;  Surgeon: Bismark Meyer MD;  Location:  [facility];  Service: Plastics;  Laterality: N/A;    APPENDECTOMY      SBO, emergent laparotomy    BACK SURGERY      lower back    BREAST AUGMENTATION Bilateral 2022    Procedure: BREAST AUGMENTATION AND IMPLANTS AND  LIPOSUCTION;  Surgeon: Bismark Meyer MD;  Location:  [facility];  Service: Plastics;  Laterality: Bilateral;    EXPLORATORY LAPAROTOMY      at age 6    MASTOPEXY Bilateral 2022    " Procedure: MASTOPEXY;  Surgeon: Bismark Meyer MD;  Location: [facility];  Service: Plastics;  Laterality: Bilateral;       Current Facility-Administered Medications:     acetaminophen (TYLENOL) tablet 650 mg, 650 mg, Oral, Q4H PRN, Justice Lomas MD    acetaminophen (TYLENOL) tablet 650 mg, 650 mg, Oral, Once PRN, Justice Lomas MD    carboprost (HEMABATE) injection 250 mcg, 250 mcg, Intramuscular, Once PRN, Justice Lomas MD    ePHEDrine Sulfate (Pressors) 5 MG/ML injection 5 mg, 5 mg, Intravenous, Q10 Min PRN, Tylor Mcgowan CRNA    fentaNYL 2mcg/mL and ropivacaine 0.2% in NS epidural 100 mL, 10 mL/hr, Epidural, Continuous, Tylor Mcgowan CRNA    HYDROcodone-acetaminophen (NORCO) 5-325 MG per tablet 1 tablet, 1 tablet, Oral, Q4H PRN, Justice Lomas MD    hydrOXYzine (ATARAX) tablet 50 mg, 50 mg, Oral, Nightly PRN, Justice Lomas MD    lactated ringers bolus 1,000 mL, 1,000 mL, Intravenous, Once, Justice Lomas MD    lactated ringers bolus 1,000 mL, 1,000 mL, Intravenous, Once, Tylor Mcgowan CRNA    lactated ringers infusion, 30 mL/hr, Intravenous, Continuous, Mikaela Galvan CNM    lidocaine (XYLOCAINE) 1 % injection 0.5 mL, 0.5 mL, Intradermal, Once PRN, Justice Lomas MD    methylergonovine (METHERGINE) injection 200 mcg, 200 mcg, Intramuscular, Once PRN, Justice Lomas MD    miSOPROStol (CYTOTEC) tablet 800 mcg, 800 mcg, Rectal, Once PRN, Justice Lomas MD    morphine injection 4 mg, 4 mg, Intravenous, Q4H PRN, Justice Lomas MD    morphine injection 4 mg, 4 mg, Intravenous, Once PRN, Justice Lomas MD    morphine injection 6 mg, 6 mg, Intravenous, Q4H PRN, Justice Lomas MD    Morphine sulfate (PF) injection 2 mg, 2 mg, Intravenous, Once PRN, Justice Lomas MD    ondansetron ODT (ZOFRAN-ODT) disintegrating tablet 4 mg, 4 mg, Oral, Q6H PRN **OR** ondansetron (ZOFRAN) injection 4 mg, 4 mg, Intravenous, Q6H PRN,  Justice Lomas MD    ondansetron ODT (ZOFRAN-ODT) disintegrating tablet 4 mg, 4 mg, Oral, Once PRN **OR** ondansetron (ZOFRAN) injection 4 mg, 4 mg, Intravenous, Once PRN, Justice Lomas MD    ondansetron (ZOFRAN) injection 4 mg, 4 mg, Intravenous, Once PRN, Tylor Mcgowan CRNA    oxytocin (PITOCIN) 30 units in 0.9% sodium chloride 500 mL (premix), 1-20 michele-units/min, Intravenous, Titrated, Mikaela Galvan CNM, Last Rate: 10 mL/hr at 10/18/24 0654, 10 michele-units/min at 10/18/24 0654    oxytocin (PITOCIN) 30 units in 0.9% sodium chloride 500 mL (premix), 999 mL/hr, Intravenous, Once **FOLLOWED BY** [] oxytocin (PITOCIN) 30 units in 0.9% sodium chloride 500 mL (premix), 250 mL/hr, Intravenous, Continuous, Justice Lomas MD    [COMPLETED] penicillin G in iso-osmotic dextrose IVPB 3 million units (premix), 3 Million Units, Intravenous, Q30 Min, Last Rate: 100 mL/hr at 10/18/24 0017, 3 Million Units at 10/18/24 0017 **FOLLOWED BY** penicillin G in iso-osmotic dextrose IVPB 3 million units (premix), 3 Million Units, Intravenous, Q4H, Mikaela Galvan CNM, Last Rate: 100 mL/hr at 10/18/24 0318, 3 Million Units at 10/18/24 0318    promethazine (PHENERGAN) suppository 12.5 mg, 12.5 mg, Rectal, Q6H PRN **OR** promethazine (PHENERGAN) tablet 12.5 mg, 12.5 mg, Oral, Q6H PRN, Justice Lomas MD    promethazine (PHENERGAN) tablet 12.5 mg, 12.5 mg, Oral, Q6H PRN, Justice Lomas MD    Sod Citrate-Citric Acid (BICITRA) oral solution 30 mL, 30 mL, Oral, Once, Tylor Mcgowan CRNA    sodium chloride 0.9 % flush 10 mL, 10 mL, Intravenous, Q12H, Justice Lomas MD    sodium chloride 0.9 % flush 10 mL, 10 mL, Intravenous, PRN, Justice Lomas MD    No Known Allergies  Social History    Tobacco Use      Smoking status: Former        Packs/day: 0.00        Years: 1 pack/day for 5.0 years (5.0 ttl pk-yrs)        Types: Cigars, Cigarettes        Start date: 12/10/2018        Quit  "date: 12/10/2021        Years since quittin.8        Passive exposure: Yes      Smokeless tobacco: Never    Review of Systems   Constitutional: Negative.    Respiratory: Negative.     Cardiovascular: Negative.    Gastrointestinal: Negative.    Genitourinary: Negative.    Musculoskeletal: Negative.    Neurological: Negative.    Psychiatric/Behavioral: Negative.     All other systems reviewed and are negative.        Objective  /74   Pulse 91   Temp 97.9 °F (36.6 °C) (Oral)   Resp 16   Ht 167.6 cm (66\")   Wt 109 kg (240 lb 12.8 oz)   LMP 01/10/2024   SpO2 100%   Breastfeeding Yes   BMI 38.87 kg/m²                                           General:  well developed; well nourished  no acute distress  mentation appropriate   Neck:    Heart:   supple and no masses  normal apical impulse  regular rate and rhythm   Lungs: breathing is unlabored   Abdomen: Gravid and non-tender  EFW: 8#, growth scan @ 36 wks=71%, AC 90%, posterior placenta   FHT's: reassuring, reactive, and category 1   Cervix: was checked (by me): 2-3 cm / 70 % / -3 very posterior and to the right, attempted AROM but unsuccessful   Presentation: cephalic   Contractions: every 3-4 minutes - external monitors used Pitocin @ 12 mu   Extremities:   normal appearance with no cyanosis or edema and no calf tenderness   Skin:  Skin color, texture, turgor normal. No rashes or lesions or Skin warm and dry   Psych:  Normal. and Alert and oriented, appropriate affect.       Prenatal Labs  Lab Results   Component Value Date    HGB 12.8 10/17/2024    HEPBSAG Negative 2024    ABSCRN Negative 10/17/2024    FAT4GAX8 Non Reactive 2024    HEPCVIRUSABY Non Reactive 2024    STREPGPB Positive (A) 2024    URINECX >100,000 CFU/mL Escherichia coli (A) 10/15/2024       Current Labs Reviewed   Lab Results (last 24 hours)       Procedure Component Value Units Date/Time    CBC & Differential [[id number]]  (Abnormal) Collected: 10/17/24 2302    " Specimen: Blood Updated: 10/17/24 2313    Narrative:      The following orders were created for panel order CBC & Differential.  Procedure                               Abnormality         Status                     ---------                               -----------         ------                     CBC Auto Differential[[id number]]        Abnormal            Final result                 Please view results for these tests on the individual orders.    CBC Auto Differential [[id number]]  (Abnormal) Collected: 10/17/24 2302    Specimen: Blood Updated: 10/17/24 2313     WBC 5.53 10*3/mm3      RBC 4.47 10*6/mm3      Hemoglobin 12.8 g/dL      Hematocrit 38.8 %      MCV 86.8 fL      MCH 28.6 pg      MCHC 33.0 g/dL      RDW 14.4 %      RDW-SD 45.5 fl      MPV 12.2 fL      Platelets 170 10*3/mm3      Neutrophil % 60.5 %      Lymphocyte % 28.4 %      Monocyte % 9.8 %      Eosinophil % 0.4 %      Basophil % 0.2 %      Immature Grans % 0.7 %      Neutrophils, Absolute 3.35 10*3/mm3      Lymphocytes, Absolute 1.57 10*3/mm3      Monocytes, Absolute 0.54 10*3/mm3      Eosinophils, Absolute 0.02 10*3/mm3      Basophils, Absolute 0.01 10*3/mm3      Immature Grans, Absolute 0.04 10*3/mm3      nRBC 0.0 /100 WBC     Treponema pallidum AB w/Reflex RPR [[id number]] Collected: 10/17/24 2302    Specimen: Blood Updated: 10/17/24 2309               ASSESSMENT  IUP at 40w2d  Induction of labor because of elective at term per patient request  Group B strep status: positive  Reactive NST    PLAN  Admit to   Low dose Pitocin induction and increase per protocol @ 0500  All procedures explained to patient and partner. Questions answered and verbalized understanding.  Epidural when needed  Anticipate     Mikaela Galvan, APRN  10/18/2024  07:36 EDT         Electronically signed by Mehnaz Ho MD at 10/18/24 0901       Maternal Vitals (last 2 days)       Date/Time Temp Pulse Resp BP SpO2 Weight    10/19/24 0245 98 (36.7) 98 18 108/63 --  --    10/18/24 2257 98.3 (36.8) 99 16 111/68 99 --    10/18/24 2157 98.2 (36.8) 92 16 114/70 -- --    10/18/24 2100 98.1 (36.7) 81 16 135/77 100 --    10/18/24 2030 -- 84 -- 144/84 100 --    10/18/24 2000 -- 73 -- 129/83 100 --    10/18/24 1946 -- 72 -- 122/79 -- --    10/18/24 1930 -- 73 -- 127/65 100 --    10/18/24 1920 -- 76 -- -- 100 --    10/18/24 1917 -- 86 -- -- -- --    10/18/24 1915 98.3 (36.8) 79 -- 115/59 100 --    10/18/24 1910 -- 82 -- -- 100 --    10/18/24 1905 -- 82 -- -- 97 --    10/18/24 1900 -- 83 -- 126/97 100 --    10/18/24 1855 -- 79 -- -- 100 --    10/18/24 1850 -- 74 -- -- 100 --    10/18/24 1845 -- 75 -- 115/59 100 --    10/18/24 1840 -- 91 -- -- 100 --    10/18/24 1839 -- 91 -- -- 100 --    10/18/24 1835 -- 92 -- -- 100 --    10/18/24 1831 -- 96 -- 106/64 -- --    10/18/24 1830 -- 102 -- -- 100 --    10/18/24 1816 -- 93 -- 127/73 -- --    10/18/24 1801 -- 86 -- -- -- --    10/18/24 1800 -- 93 -- -- 100 --    10/18/24 1759 -- 93 -- -- 100 --    10/18/24 1755 -- 85 -- -- 100 --    10/18/24 1750 -- 94 -- -- 100 --    10/18/24 1746 -- 120 -- 116/54 -- --    10/18/24 1745 -- 81 -- -- 100 --    10/18/24 1740 -- 75 -- -- 100 --    10/18/24 1735 -- 67 -- -- 100 --    10/18/24 1730 -- 73 -- 98/53 100 --    10/18/24 1725 -- 63 -- -- 100 --    10/18/24 1720 -- 69 -- -- 100 --    10/18/24 1716 -- 74 -- 100/59 -- --    10/18/24 1701 -- 62 -- 105/59 -- --    10/18/24 1700 -- 62 -- 105/59 -- --    10/18/24 1240 -- 64 -- -- 100 --    10/18/24 1235 -- 65 -- -- 100 --    10/18/24 1231 -- 69 -- 108/66 -- --    10/18/24 1230 -- 69 -- -- 99 --    10/18/24 1225 98 (36.7) 70 -- -- 100 --    10/18/24 1216 -- 99 -- 116/80 -- --    10/18/24 1215 -- 70 -- -- 100 --    10/18/24 1214 -- 70 -- -- 100 --    10/18/24 1210 -- 89 -- -- 96 --    10/18/24 1205 -- 75 -- -- 97 --    10/18/24 1200 -- 106 -- 107/85 99 --    10/18/24 1155 -- 90 -- -- 98 --    10/18/24 1150 -- 70 -- -- 100 --    10/18/24 1145 -- 88 -- 109/74 100 --     10/18/24 1140 -- 70 -- -- 100 --    10/18/24 1135 -- 62 -- -- 99 --    10/18/24 1131 -- 77 -- 89/60 -- --    10/18/24 1130 -- 85 -- -- 100 --    10/18/24 1125 -- 118 -- -- 98 --    10/18/24 1120 -- 82 -- -- 100 --    10/18/24 1115 -- 88 -- 125/77 100 --    10/18/24 1114 -- 88 -- -- 100 --    10/18/24 0930 -- 75 -- 124/77 -- --    10/18/24 0915 -- 63 -- 108/64 -- --    10/18/24 0901 -- 63 -- 108/57 -- --    10/18/24 0845 -- 64 -- 112/75 -- --    10/18/24 0831 -- 65 -- 112/78 -- --    10/18/24 0816 -- 61 -- 119/83 -- --    10/18/24 0800 -- 60 -- 108/74 -- --    10/18/24 0746 -- 61 -- 110/63 -- --    10/18/24 0731 -- 65 -- 122/78 -- --    10/18/24 0727 -- 71 -- 121/78 -- --    10/18/24 0700 -- 91 -- 108/74 -- --    10/18/24 0600 97.9 (36.6) 76 16 97/53 -- --    10/18/24 0500 -- 100 -- 112/62 -- --    10/18/24 0400 -- 70 -- 116/72 -- --    10/18/24 0300 -- 74 -- 119/71 -- --    10/18/24 0200 98.3 (36.8) 75 16 112/70 -- --    10/18/24 0101 -- 87 -- 95/54 -- --    10/18/24 0000 -- 79 -- 109/63 -- --    10/17/24 2320 -- 87 -- 125/77 -- --    10/17/24 2102 -- -- -- -- -- 109 (240.8)    10/17/24 2032 98.6 (37) 101 16 122/74 100 --    10/17/24 2022 -- 101 -- 122/74 -- --    10/17/24 2000 -- -- -- -- -- 109 (240.8)          FHR (last 2 days)        Date/Time Fetal HR Assessment Method Fetal HR (beats/min) Fetal HR Baseline Fetal HR Variability Fetal HR Accelerations Fetal HR Decelerations Fetal HR Tracing Category    10/18/24 1825 external  110  normal range  moderate (amplitude range 6 to 25 bpm)  greater than/equal to 15 bpm;lasting at least 15 seconds  variable  --     10/18/24 1815 external  105  normal range  moderate (amplitude range 6 to 25 bpm)  greater than/equal to 15 bpm;lasting at least 15 seconds  variable  --     10/18/24 1800 external  105  normal range  moderate (amplitude range 6 to 25 bpm)  greater than/equal to 15 bpm;lasting at least 15 seconds  variable  --     10/18/24 1745 external  105  normal range   moderate (amplitude range 6 to 25 bpm)  greater than/equal to 15 bpm;lasting at least 15 seconds  variable  --     10/18/24 1730 external  105  normal range  moderate (amplitude range 6 to 25 bpm)  greater than/equal to 15 bpm;lasting at least 15 seconds  variable  --     10/18/24 1715 external  110  normal range  moderate (amplitude range 6 to 25 bpm)  greater than/equal to 15 bpm;lasting at least 15 seconds  absent  --     10/18/24 1700 external  105  normal range  moderate (amplitude range 6 to 25 bpm)  greater than/equal to 15 bpm;lasting at least 15 seconds  absent  --     10/18/24 1645 external  105  normal range  moderate (amplitude range 6 to 25 bpm)  greater than/equal to 15 bpm;lasting at least 15 seconds  absent  --     10/18/24 1630 external  115  normal range  moderate (amplitude range 6 to 25 bpm)  greater than/equal to 15 bpm;lasting at least 15 seconds  absent  --     10/18/24 1615 external  115  normal range  moderate (amplitude range 6 to 25 bpm)  greater than/equal to 15 bpm;lasting at least 15 seconds  absent  --     10/18/24 1600 external  105  normal range  moderate (amplitude range 6 to 25 bpm)  greater than/equal to 15 bpm;lasting at least 15 seconds  absent  --     10/18/24 1545 external  105  normal range  moderate (amplitude range 6 to 25 bpm)  greater than/equal to 15 bpm;lasting at least 15 seconds  absent  --     10/18/24 1530 external  110  normal range  moderate (amplitude range 6 to 25 bpm)  greater than/equal to 15 bpm;lasting at least 15 seconds  absent  --     10/18/24 1515 external  110  normal range  moderate (amplitude range 6 to 25 bpm)  greater than/equal to 15 bpm;lasting at least 15 seconds  absent  --     10/18/24 1500 external  115  normal range  moderate (amplitude range 6 to 25 bpm)  greater than/equal to 15 bpm;lasting at least 15 seconds  absent  --     10/18/24 1445 external  115  normal range  moderate (amplitude range 6 to 25 bpm)  greater than/equal to 15  bpm;lasting at least 15 seconds  absent  --     10/18/24 1430 external  110  normal range  moderate (amplitude range 6 to 25 bpm)  absent  absent  --     10/18/24 1415 external  115  normal range  moderate (amplitude range 6 to 25 bpm)  greater than/equal to 15 bpm;lasting at least 15 seconds  absent  --     10/18/24 1400 external  115  normal range  moderate (amplitude range 6 to 25 bpm)  greater than/equal to 15 bpm;lasting at least 15 seconds  absent  --     10/18/24 1345 external  115  normal range  moderate (amplitude range 6 to 25 bpm)  greater than/equal to 15 bpm;lasting at least 15 seconds  absent  --     10/18/24 1330 external  115  normal range  moderate (amplitude range 6 to 25 bpm)  greater than/equal to 15 bpm;lasting at least 15 seconds  absent  --     10/18/24 1315 external  120  normal range  moderate (amplitude range 6 to 25 bpm)  greater than/equal to 15 bpm;lasting at least 15 seconds  absent  --     10/18/24 1300 external  115  normal range  moderate (amplitude range 6 to 25 bpm)  greater than/equal to 15 bpm;lasting at least 15 seconds  absent  --     10/18/24 1245 external  115  normal range  moderate (amplitude range 6 to 25 bpm)  greater than/equal to 15 bpm;lasting at least 15 seconds  absent  --     10/18/24 1230 external  120  normal range  moderate (amplitude range 6 to 25 bpm)  greater than/equal to 15 bpm;lasting at least 15 seconds  absent  --     10/18/24 1215 external  120  normal range  moderate (amplitude range 6 to 25 bpm)  greater than/equal to 15 bpm;lasting at least 15 seconds  absent  --     10/18/24 1200 external  130  normal range  moderate (amplitude range 6 to 25 bpm)  absent  absent  --     10/18/24 1145 external  130  normal range  moderate (amplitude range 6 to 25 bpm)  absent  absent  --     10/18/24 1130 external  130  normal range  moderate (amplitude range 6 to 25 bpm)  greater than/equal to 15 bpm;lasting at least 15 seconds  absent  --     10/18/24 1115 external   130  normal range  moderate (amplitude range 6 to 25 bpm)  greater than/equal to 15 bpm;lasting at least 15 seconds  absent  --     10/18/24 1100 external  125  normal range  moderate (amplitude range 6 to 25 bpm)  greater than/equal to 15 bpm;lasting at least 15 seconds  absent  --     10/18/24 1045 external  125  normal range  moderate (amplitude range 6 to 25 bpm)  greater than/equal to 15 bpm;lasting at least 15 seconds  absent  --     10/18/24 1030 external  120  normal range  moderate (amplitude range 6 to 25 bpm)  absent  absent  --     10/18/24 1015 external  120  normal range  moderate (amplitude range 6 to 25 bpm)  absent  absent  --     10/18/24 1000 external  120  normal range  moderate (amplitude range 6 to 25 bpm)  greater than/equal to 15 bpm;lasting at least 15 seconds  absent  Category I     10/18/24 0945 external  120  normal range  moderate (amplitude range 6 to 25 bpm)  greater than/equal to 15 bpm;lasting at least 15 seconds  absent  Category I     10/18/24 0930 external  120  normal range  moderate (amplitude range 6 to 25 bpm)  greater than/equal to 15 bpm;lasting at least 15 seconds  absent  Category I     10/18/24 0915 external  120  normal range  moderate (amplitude range 6 to 25 bpm)  greater than/equal to 15 bpm;lasting at least 15 seconds  absent  Category I     10/18/24 0900 external  120  normal range  moderate (amplitude range 6 to 25 bpm)  greater than/equal to 15 bpm;lasting at least 15 seconds  absent  Category I     10/18/24 0845 external  120  normal range  moderate (amplitude range 6 to 25 bpm)  greater than/equal to 15 bpm;lasting at least 15 seconds  absent  Category I     10/18/24 0830 external  120  normal range  moderate (amplitude range 6 to 25 bpm)  greater than/equal to 15 bpm;lasting at least 15 seconds  absent  Category I     10/18/24 0815 external  120  normal range  moderate (amplitude range 6 to 25 bpm)  greater than/equal to 15 bpm;lasting at least 15 seconds   absent  Category I     10/18/24 0800 external  120  normal range  moderate (amplitude range 6 to 25 bpm)  greater than/equal to 15 bpm;lasting at least 15 seconds  absent  Category I     10/18/24 0745 external  120  normal range  moderate (amplitude range 6 to 25 bpm)  absent  absent  Category I     10/18/24 0730 external  120  normal range  moderate (amplitude range 6 to 25 bpm)  absent  absent  Category I     10/18/24 0715 external  115  normal range  moderate (amplitude range 6 to 25 bpm)  greater than/equal to 15 bpm;lasting at least 15 seconds  absent  Category I     10/18/24 0654 external  120  normal range  moderate (amplitude range 6 to 25 bpm)  greater than/equal to 15 bpm;lasting at least 15 seconds  absent  Category I     10/18/24 0645 external  120  normal range  moderate (amplitude range 6 to 25 bpm)  greater than/equal to 15 bpm;lasting at least 15 seconds  absent  Category I     10/18/24 0625 external  115  normal range  moderate (amplitude range 6 to 25 bpm)  greater than/equal to 15 bpm;lasting at least 15 seconds  absent  Category I     10/18/24 0615 external  120  normal range  moderate (amplitude range 6 to 25 bpm)  greater than/equal to 15 bpm;lasting at least 15 seconds  absent  Category I     10/18/24 0600 external  115  normal range  moderate (amplitude range 6 to 25 bpm)  greater than/equal to 15 bpm;lasting at least 15 seconds  absent  Category I     10/18/24 0545 external  105  normal range  moderate (amplitude range 6 to 25 bpm)  greater than/equal to 15 bpm;lasting at least 15 seconds  absent  Category I     10/18/24 0530 external  105  normal range  moderate (amplitude range 6 to 25 bpm)  greater than/equal to 15 bpm;lasting at least 15 seconds  absent  Category I     10/18/24 0514 external  130  normal range  moderate (amplitude range 6 to 25 bpm)  greater than/equal to 15 bpm;lasting at least 15 seconds  absent  --     10/18/24 0500 external  110  normal range  moderate (amplitude  range 6 to 25 bpm)  lasting at least 15 seconds;greater than/equal to 15 bpm  absent  Category I     10/18/24 0430 external  110  normal range  moderate (amplitude range 6 to 25 bpm)  lasting at least 15 seconds;greater than/equal to 15 bpm  absent  Category I     10/18/24 0400 external  115  normal range  moderate (amplitude range 6 to 25 bpm)  lasting at least 15 seconds;greater than/equal to 15 bpm  absent  Category I     10/18/24 0330 external  115  normal range  moderate (amplitude range 6 to 25 bpm)  lasting at least 15 seconds;greater than/equal to 15 bpm  absent  --     10/18/24 0255 external  115  normal range  moderate (amplitude range 6 to 25 bpm)  lasting at least 15 seconds;greater than/equal to 15 bpm  absent  Category I     10/18/24 0230 external  115  normal range  moderate (amplitude range 6 to 25 bpm)  lasting at least 15 seconds;greater than/equal to 15 bpm  absent  --     10/18/24 0200 external  125  normal range  moderate (amplitude range 6 to 25 bpm)  lasting at least 15 seconds;greater than/equal to 15 bpm  absent  Category I     10/18/24 0125 external  130  normal range  moderate (amplitude range 6 to 25 bpm)  lasting at least 15 seconds;greater than/equal to 15 bpm  absent  --     10/18/24 0100 external  115  normal range  moderate (amplitude range 6 to 25 bpm)  lasting at least 15 seconds;greater than/equal to 15 bpm  absent  --     10/18/24 0030 external  120  normal range  moderate (amplitude range 6 to 25 bpm)  lasting at least 15 seconds;greater than/equal to 15 bpm  absent  Category I     10/18/24 0025 external  125  normal range  moderate (amplitude range 6 to 25 bpm)  lasting at least 15 seconds;greater than/equal to 15 bpm  absent  Category I     10/18/24 0000 external  135  normal range  moderate (amplitude range 6 to 25 bpm)  lasting at least 15 seconds;greater than/equal to 15 bpm  absent  Category I     10/17/24 2328 external  130  normal range  moderate (amplitude range 6 to 25  bpm)  lasting at least 15 seconds;greater than/equal to 15 bpm  absent  Category I     10/17/24 2324 external  135  normal range  moderate (amplitude range 6 to 25 bpm)  greater than/equal to 15 bpm;lasting at least 15 seconds  absent  --     10/17/24 2300 external  130  normal range  moderate (amplitude range 6 to 25 bpm)  greater than/equal to 15 bpm;lasting at least 15 seconds  absent  --     10/17/24 2130 external  135  normal range  moderate (amplitude range 6 to 25 bpm)  greater than/equal to 15 bpm;lasting at least 15 seconds  absent  Category I     10/17/24 2100 external  130  normal range  moderate (amplitude range 6 to 25 bpm)  greater than/equal to 15 bpm;lasting at least 15 seconds  absent  Category I                   Uterine Activity (last 2 days)        Date/Time Method Contraction Frequency (Minutes) Contraction Duration (sec) Contraction Intensity Uterine Resting Tone Contraction Pattern    10/18/24 1825 external tocotransducer  2-3  60-70  moderate by palpation  soft by palpation  --     10/18/24 1815 external tocotransducer  2-3  60-80  moderate by palpation  soft by palpation  --     10/18/24 1800 external tocotransducer  2-3  60-80  moderate by palpation  soft by palpation  --     10/18/24 1745 external tocotransducer  3  60-80  moderate by palpation  soft by palpation  --     10/18/24 1730 external tocotransducer  2-3  70-80  moderate by palpation  soft by palpation  --     10/18/24 1715 external tocotransducer  2-3  60-70  moderate by palpation  soft by palpation  --     10/18/24 1700 external tocotransducer  2-3  60-70  moderate by palpation  soft by palpation  --     10/18/24 1645 external tocotransducer  2  60-80  moderate by palpation  soft by palpation  --     10/18/24 1630 external tocotransducer  2  60-70  moderate by palpation  soft by palpation  --     10/18/24 1615 external tocotransducer  2  50-70  moderate by palpation  soft by palpation  --     10/18/24 1600 external  tocotransducer  2  50-70  moderate by palpation  soft by palpation  --     10/18/24 1545 external tocotransducer  2-3  50-80  moderate by palpation  soft by palpation  --     10/18/24 1530 external tocotransducer  3-4  50-80  moderate by palpation  soft by palpation  --     10/18/24 1515 external tocotransducer  2-5  60-70  moderate by palpation  soft by palpation  --     10/18/24 1500 external tocotransducer  3-4  60-70  moderate by palpation  soft by palpation  --     10/18/24 1445 external tocotransducer  3-4  70-80  moderate by palpation  soft by palpation  --     10/18/24 1430 external tocotransducer  1-2  40-60  moderate by palpation  soft by palpation  --     10/18/24 1415 external tocotransducer  1-2  60-70  moderate by palpation  soft by palpation  --     10/18/24 1400 external tocotransducer  1-2  40-60  moderate by palpation  soft by palpation  --     10/18/24 1345 external tocotransducer  1-2  40-60  moderate by palpation  soft by palpation  --     10/18/24 1330 external tocotransducer  1-3  50-70  moderate by palpation  soft by palpation  --     10/18/24 1315 external tocotransducer  1-2  60-70  moderate by palpation  soft by palpation  --     10/18/24 1300 external tocotransducer  2-3  60-70  moderate by palpation  soft by palpation  --     10/18/24 1245 external tocotransducer  1-3  50-70  moderate by palpation  soft by palpation  --     10/18/24 1230 external tocotransducer  1-2  50-60  moderate by palpation  soft by palpation  --     10/18/24 1215 external tocotransducer  2-3  50-60  moderate by palpation  soft by palpation  --     10/18/24 1200 external tocotransducer  2  50-60  moderate by palpation  soft by palpation  --     10/18/24 1145 external tocotransducer  2  50-60  moderate by palpation  soft by palpation  --     10/18/24 1130 external tocotransducer  2  50-60  moderate by palpation  soft by palpation  --     10/18/24 1115 external tocotransducer  1-4  40-60  moderate by palpation  soft  by palpation  --     10/18/24 1100 external tocotransducer  2-3  50-60  moderate by palpation  soft by palpation  --     10/18/24 1045 external tocotransducer  2  50-60  moderate by palpation  soft by palpation  --     10/18/24 1030 external tocotransducer  2  50-70  moderate by palpation  soft by palpation  --     10/18/24 1015 external tocotransducer  1-3  50-70  moderate by palpation  soft by palpation  --     10/18/24 1000 external tocotransducer  1-2  50-70  moderate by palpation  soft by palpation  --     10/18/24 0945 external tocotransducer  1-2  50-70  moderate by palpation  soft by palpation  --     10/18/24 0930 external tocotransducer  2-3  60-70  moderate by palpation  soft by palpation  --     10/18/24 0915 external tocotransducer  2-3  60-70  moderate by palpation  soft by palpation  --     10/18/24 0900 external tocotransducer  2-4  60-70  moderate by palpation  soft by palpation  --     10/18/24 0845 external tocotransducer  2-3  40-60  moderate by palpation  soft by palpation  --     10/18/24 0830 external tocotransducer;palpation;per patient report  2-3  50-70  moderate by palpation  soft by palpation  --     10/18/24 0815 external tocotransducer  2-3  50-70  moderate by palpation  soft by palpation  --     10/18/24 0800 external tocotransducer  1-3  50-70  mild by palpation  soft by palpation  --     10/18/24 0745 external tocotransducer  2-3  70-80  mild by palpation  soft by palpation  --     10/18/24 0730 external tocotransducer  2  40-50  mild by palpation  soft by palpation  --     10/18/24 0715 external tocotransducer  2-3  40-50  mild by palpation  soft by palpation  --     10/18/24 0654 external tocotransducer;palpation  2-3  50-70  mild by palpation  soft by palpation  --     10/18/24 0645 external tocotransducer;palpation  4-6  60-80  mild by palpation  soft by palpation  --     10/18/24 0625 external tocotransducer;palpation  2-3  50-70  mild by palpation  soft by palpation  --      10/18/24 0615 external tocotransducer;palpation  3-5  60-80  mild by palpation  soft by palpation  --     10/18/24 0600 external tocotransducer;palpation  3-4  60-80  mild by palpation  soft by palpation  --     10/18/24 0545 external tocotransducer;palpation  3-5  60-80  mild by palpation  soft by palpation  --     10/18/24 0530 external tocotransducer;palpation  3-5  60-80  mild by palpation  soft by palpation  --     10/18/24 0514 external tocotransducer;palpation  6-7  60-80  mild by palpation  soft by palpation  --     10/18/24 0500 external tocotransducer;palpation  3-6  60-80  mild by palpation  soft by palpation  --     10/18/24 0430 external tocotransducer;palpation  3-6  60-80  mild by palpation  soft by palpation  --     10/18/24 0400 external tocotransducer  3-4  60-80  mild by palpation  soft by palpation  --     10/18/24 0330 external tocotransducer  4-6  60-80  mild by palpation  soft by palpation  --     10/18/24 0255 external tocotransducer  --  --  --  --  --     10/18/24 0200 external tocotransducer  4-6  60-80  --  --  --     10/18/24 0025 external tocotransducer  --  --  --  --  --     10/18/24 0000 external tocotransducer;palpation  3-7  40-60  --  --  --     10/17/24 2328 --  5-6  --  --  --  --     10/17/24 2324 external tocotransducer;palpation  5-8  40-60  --  --  --     10/17/24 2300 external tocotransducer;palpation  occasional  40-60  --  --  --     10/17/24 2157 other (see comments)  Pt out in waiting room telling son goodbye  --  --  --  --  --     10/17/24 2130 external tocotransducer;palpation  5-6  40-60  mild by palpation  soft by palpation  --     10/17/24 2100 external tocotransducer;palpation  5-8  40-60  mild by palpation  soft by palpation  --                   Labor Pain (last 2 days)        Date/Time (0-10) Pain Rating: Rest (0-10) Pain Rating: Activity Pain Management Interventions    10/19/24 1000 --  --  pain management plan reviewed with patient/caregiver     10/19/24  0830 3  6  pain management plan reviewed with patient/caregiver     10/19/24 0643 4  4  --     10/19/24 0357 6  6  pain management plan reviewed with patient/caregiver;pain medication given;around-the-clock dosing utilized     10/19/24 0236 5  5  --     10/19/24 0206 10  10  pain medication given;pain management plan reviewed with patient/caregiver;around-the-clock dosing utilized     10/19/24 0103 10  10  pain medication given;pain management plan reviewed with patient/caregiver;around-the-clock dosing utilized     10/18/24 2210 10  10  pain medication given;cold applied     10/18/24 2000 0  0  --     10/18/24 1010 --  10  --     10/18/24 0810 --  7  --     10/18/24 0715 --  1  --     10/18/24 0657 0  0  --     10/18/24 0600 0  0  --     10/18/24 0500 0  0  --     10/18/24 0400 0  0  --     10/18/24 0300 0  0  --     10/18/24 0200 0  0  --     10/18/24 0100 0  0  --     10/18/24 0000 0  0  --     10/17/24 2300 0  0  --     10/17/24 2200 0  0  --     10/17/24 2032 0  0  --

## 2024-10-19 NOTE — NURSING NOTE
RN assisted pt to bathroom. Pt ambulated to bathroom with 1 assist and was able to void. Fundus firm 1 cm below U, light rubra lochia. Pt transferred to  in stable condition.

## 2024-10-20 VITALS
DIASTOLIC BLOOD PRESSURE: 58 MMHG | WEIGHT: 240.8 LBS | SYSTOLIC BLOOD PRESSURE: 95 MMHG | BODY MASS INDEX: 38.7 KG/M2 | TEMPERATURE: 98 F | HEART RATE: 70 BPM | OXYGEN SATURATION: 98 % | HEIGHT: 66 IN | RESPIRATION RATE: 17 BRPM

## 2024-10-20 PROCEDURE — 0503F POSTPARTUM CARE VISIT: CPT | Performed by: STUDENT IN AN ORGANIZED HEALTH CARE EDUCATION/TRAINING PROGRAM

## 2024-10-20 RX ORDER — ACETAMINOPHEN 500 MG
1000 TABLET ORAL EVERY 6 HOURS PRN
Qty: 60 TABLET | Refills: 0 | Status: SHIPPED | OUTPATIENT
Start: 2024-10-20

## 2024-10-20 RX ORDER — IBUPROFEN 800 MG/1
800 TABLET, FILM COATED ORAL EVERY 6 HOURS PRN
Qty: 30 TABLET | Refills: 0 | Status: SHIPPED | OUTPATIENT
Start: 2024-10-20

## 2024-10-20 RX ADMIN — AMOXICILLIN AND CLAVULANATE POTASSIUM 1 TABLET: 875; 125 TABLET, FILM COATED ORAL at 10:00

## 2024-10-20 RX ADMIN — ACETAMINOPHEN 1000 MG: 500 TABLET, FILM COATED ORAL at 10:00

## 2024-10-20 RX ADMIN — PRENATAL VITAMINS-IRON FUMARATE 27 MG IRON-FOLIC ACID 0.8 MG TABLET 1 TABLET: at 10:00

## 2024-10-20 RX ADMIN — DOCUSATE SODIUM 100 MG: 100 CAPSULE, LIQUID FILLED ORAL at 10:00

## 2024-10-20 RX ADMIN — IBUPROFEN 800 MG: 800 TABLET ORAL at 06:22

## 2024-10-20 RX ADMIN — ACETAMINOPHEN 1000 MG: 500 TABLET, FILM COATED ORAL at 04:22

## 2024-10-20 RX ADMIN — IBUPROFEN 800 MG: 800 TABLET ORAL at 12:02

## 2024-10-20 RX ADMIN — BENZOCAINE AND LEVOMENTHOL: 200; 5 SPRAY TOPICAL at 12:02

## 2024-10-20 RX ADMIN — OXYCODONE 5 MG: 5 TABLET ORAL at 00:00

## 2024-10-20 NOTE — PLAN OF CARE
Goal Outcome Evaluation:  Plan of Care Reviewed With: patient        Progress: improving  Outcome Evaluation: VSS, I & O adequate, positive bonding observed, pain controlled with oral medication. Breastfeeding progressing.

## 2024-10-20 NOTE — PROGRESS NOTES
[facility]  : 2000  MRN: [id number]  CSN: [id number]    Postpartum Day #2  Subjective   Her pain is well controlled. Vaginal bleeding is appropriate. She is tolerating oral intake. She is ambulatory. She is voiding spontaneously. She is breast feeding without concern.     Objective     Min/max vitals past 24 hours:   Temp  Min: 97.9 °F (36.6 °C)  Max: 98.3 °F (36.8 °C)  BP  Min: 95/58  Max: 109/72  Pulse  Min: 70  Max: 84  Resp  Min: 16  Max: 18        General: Well developed, well nourished and in no acute distress   Abdomen: Soft, non-tender, no masses and fundus firm and non-tender   Pelvic: Not performed   Ext: Non-tender, mild edema     Lab Results   Component Value Date    WBC 10.44 10/19/2024    HGB 13.1 10/19/2024    HCT 39.6 10/19/2024    MCV 87.8 10/19/2024     10/19/2024    RUBELLAABIGG 1.02 2024    HEPBSAG Negative 2024        Assessment & Plan    Postpartum Day #2 S/P spontaneous vaginal delivery complicated by shoulder dystocia and PPH.  Continue routine postpartum care  Encourage ambulation  Breastfeeding support PRN  PPH - H/H stable, no concerns  Plans BTL for contraception - will need to sign FF    Discharge today with 6 wk PP follow up or sooner as needed.    Mehnaz Ho MD  10/20/2024  10:38 EDT

## 2024-10-20 NOTE — DISCHARGE SUMMARY
Discharge Summary    [facility]  : 2000  MRN: [id number]  [facility]: [id number]    Date of Admission: 10/17/2024   Date of Discharge:  10/20/2024   Delivering Physician: Mehnaz Ho       Admission Diagnosis: Encounter for induction of labor [Z34.90]   Discharge Diagnosis: Same as above plus  Pregnancy at 40w2d - delivered  Shoulder dystocia  Postpartum hemorrhage       Procedures: 10/18/2024 - Vaginal, Spontaneous      Hospital Course  Patient is a 24 y.o.  who had a vaginal birth at 40w2d. Her delivery was complicated by a shoulder dystocia and a postpartum hemorrhage. She and her baby recovered well and her postpartum course was without complications. On PPD # 2, she was ready for discharge. She was hemodynamically stable, had normal lochia, and her pain was well controlled with oral medications. She was breastfeeding without concern. She desires a bilateral tubal ligation for contraception and plans to sign her Federal Form in the office.    Infant  male fetus weighing 4547 g (10 lb 0.4 oz)  Apgars -  7 @ 1 minute /  9 @ 5 minutes.    Discharge Labs  Lab Results   Component Value Date    WBC 10.44 10/19/2024    HGB 13.1 10/19/2024    HCT 39.6 10/19/2024     10/19/2024       Discharge Medications     Discharge Medications        New Medications        Instructions Start Date   acetaminophen 500 MG tablet  Commonly known as: TYLENOL   1,000 mg, Oral, Every 6 Hours PRN      ibuprofen 800 MG tablet  Commonly known as: ADVIL,MOTRIN   800 mg, Oral, Every 6 Hours PRN             Continue These Medications        Instructions Start Date   amoxicillin-clavulanate 875-125 MG per tablet  Commonly known as: AUGMENTIN   1 tablet, Oral, 2 Times Daily      prenatal vitamin 28-0.8 28-0.8 MG tablet tablet   1 tablet, Oral, Daily             Stop These Medications      doxylamine 25 MG tablet  Commonly known as: UNISOM     famotidine 20 MG tablet  Commonly known as: PEPCID      ondansetron ODT 4 MG disintegrating tablet  Commonly known as: ZOFRAN-ODT     vitamin B-6 25 MG tablet  Commonly known as: PYRIDOXINE              Discharge Disposition: Home or Self Care   Condition on Discharge: Good   Follow-up: 6 weeks with GEORGETTE Ho MD  10/20/2024

## 2024-10-20 NOTE — PLAN OF CARE
Goal Outcome Evaluation:  Plan of Care Reviewed With: patient           Outcome Evaluation: VSS, continues to bond well with infant. Pain controlled with rx meds. Ambulation encouraged. Bernarda syed.

## 2024-10-29 ENCOUNTER — MATERNAL SCREENING (OUTPATIENT)
Dept: CALL CENTER | Facility: HOSPITAL | Age: 24
End: 2024-10-29
Payer: COMMERCIAL

## 2024-10-29 NOTE — OUTREACH NOTE
Maternal Screening Survey      Flowsheet Row Responses   Facility patient discharged from? [facility]   Attempt successful? No   Unsuccessful attempts Attempt 1              Serenity COOPER - Registered Nurse

## 2024-10-29 NOTE — OUTREACH NOTE
Maternal Screening Survey      Flowsheet Row Responses   Facility patient discharged from? [facility]   Attempt successful? No   Unsuccessful attempts Attempt 2              Serenity COOPER - Registered Nurse

## 2024-10-30 ENCOUNTER — MATERNAL SCREENING (OUTPATIENT)
Dept: CALL CENTER | Facility: HOSPITAL | Age: 24
End: 2024-10-30
Payer: COMMERCIAL

## 2024-10-30 NOTE — OUTREACH NOTE
Maternal Screening Survey      Flowsheet Row Responses   Facility patient discharged from? [facility]   Attempt successful? No   Unsuccessful attempts Attempt 3   Revoke Decline to participate              Serena LANDRUM - Registered Nurse

## (undated) DEVICE — BLANKT WARM UNDER/BDY FUL/ACC A/ 90X206CM

## (undated) DEVICE — SUT SILK 2/0 TIES 18IN A185H

## (undated) DEVICE — DRSNG PAD ABD 8X10IN STRL

## (undated) DEVICE — Device

## (undated) DEVICE — BLAKE SILICONE DRAIN, 15 FR ROUND, HUBLESS WITH 3/16" TROCAR: Brand: BLAKE

## (undated) DEVICE — SUT ETHLN 0 LP XLH 72IN D5854

## (undated) DEVICE — PROXIMATE RH ROTATING HEAD SKIN STAPLERS (35 WIDE) CONTAINS 35 STAINLESS STEEL STAPLES: Brand: PROXIMATE

## (undated) DEVICE — DRSNG SURESITE WNDW 2.38X2.75

## (undated) DEVICE — SUT MNCRYL PLS ANTIB UD 3/0 PS2 27IN

## (undated) DEVICE — DISPOSABLE BIPOLAR FORCEPS 7 3/4" (19.7CM) SCOVILLE BAYONET, INSULATED, 1.5MM TIP AND 12 FT. (3.6M) CABLE: Brand: KIRWAN

## (undated) DEVICE — BNDG ELAS W/CLIP 6IN 10YD LF STRL

## (undated) DEVICE — TOTAL TRAY, 16FR 10ML SIL FOLEY, URN: Brand: MEDLINE

## (undated) DEVICE — NDL HYPO ECLPS SFTY 25G 1 1/2IN

## (undated) DEVICE — INTENDED FOR TISSUE SEPARATION, AND OTHER PROCEDURES THAT REQUIRE A SHARP SURGICAL BLADE TO PUNCTURE OR CUT.: Brand: BARD-PARKER ® STAINLESS STEEL BLADES

## (undated) DEVICE — ADHS SKIN PREMIERPRO EXOFIN TOPICAL HI/VISC .5ML

## (undated) DEVICE — DRSNG SURESITE WNDW 4X4.5

## (undated) DEVICE — CLTH CLENS READYCLEANSE PERI CARE PK/5

## (undated) DEVICE — GLV SURG BIOGEL LTX PF 7 1/2

## (undated) DEVICE — BIOPATCH™ ANTIMICROBIAL DRESSING WITH CHLORHEXIDINE GLUCONATE IS A HYDROPHILLIC POLYURETHANE ABSORPTIVE FOAM WITH CHLORHEXIDINE GLUCONATE (CHG) WHICH INHIBITS BACTERIAL GROWTH UNDER THE DRESSING. THE DRESSING IS INTENDED TO BE USED TO ABSORB EXUDATE, COVER A WOUND CAUSED BY VASCULAR AND NONVASCULAR PERCUTANEOUS MEDICAL DEVICES DURING SURGERY, AS WELL AS REDUCE LOCAL INFECTION AND COLONIZATION OF MICROORGANISMS.: Brand: BIOPATCH

## (undated) DEVICE — SUT MNCRYL PLS ANTIB UD 4/0 PS2 18IN

## (undated) DEVICE — TBG SXN LIPECTOMY 108IN

## (undated) DEVICE — SUT MNCRYL 5/0 PC1 18IN Y834G

## (undated) DEVICE — TP PLSTC CLR TRNSPORE 1IN SURG

## (undated) DEVICE — INTENDED USE FOR SURGICAL MARKING ON INTACT SKIN, ALSO PROVIDES A PERMANENT METHOD OF IDENTIFYING OBJECTS IN THE OPERATING ROOM: Brand: WRITESITE® REGULAR TIP SKIN MARKER

## (undated) DEVICE — PENCL ROCKRSWCH MEGADYNE W/HOLSTR 10FT SS

## (undated) DEVICE — TRAP FLD MINIVAC MEGADYNE 100ML

## (undated) DEVICE — PATIENT RETURN ELECTRODE, SINGLE-USE, CONTACT QUALITY MONITORING, ADULT, WITH 9FT CORD, FOR PATIENTS WEIGING OVER 33LBS. (15KG): Brand: MEGADYNE

## (undated) DEVICE — STPLR SKIN SUBCUTICULAR INSORB 2030

## (undated) DEVICE — SYR CONTRL PRESS/LO FIX/M/LL W/THMB/RNG 10ML

## (undated) DEVICE — ANTIBACTERIAL UNDYED BRAIDED (POLYGLACTIN 910), SYNTHETIC ABSORBABLE SUTURE: Brand: COATED VICRYL

## (undated) DEVICE — BNDR ABD PREMIUM/UNIV 10IN 27TO48IN

## (undated) DEVICE — SUT GUT PLN FAST ABS 5/0 PC1 18IN 1915G

## (undated) DEVICE — ELECTRD BLD EZ CLN MOD XLNG 2.75IN

## (undated) DEVICE — BANDAGE,GAUZE,BULKEE II,4.5"X4.1YD,STRL: Brand: MEDLINE

## (undated) DEVICE — DRAPE,TOP,102X53,STERILE: Brand: MEDLINE

## (undated) DEVICE — DRSNG GZ PETROLTM XEROFORM CURAD 1X8IN STRL

## (undated) DEVICE — PK CHST BRST 10

## (undated) DEVICE — SYS CLS SKIN PREMIERPRO EXOFINFUSION 22CM

## (undated) DEVICE — 3M™ STERI-STRIP™ REINFORCED ADHESIVE SKIN CLOSURES, R1547, 1/2 IN X 4 IN (12 MM X 100 MM), 6 STRIPS/ENVELOPE: Brand: 3M™ STERI-STRIP™

## (undated) DEVICE — TBG PENCL TELESCP MEGADYNE SMOKE EVAC 10FT